# Patient Record
Sex: MALE | Race: WHITE | Employment: OTHER | URBAN - METROPOLITAN AREA
[De-identification: names, ages, dates, MRNs, and addresses within clinical notes are randomized per-mention and may not be internally consistent; named-entity substitution may affect disease eponyms.]

---

## 2017-07-17 ENCOUNTER — HOSPITAL ENCOUNTER (EMERGENCY)
Age: 77
Discharge: HOME | End: 2017-07-17
Attending: EMERGENCY MEDICINE
Payer: MEDICARE

## 2017-07-17 ENCOUNTER — APPOINTMENT (OUTPATIENT)
Dept: GENERAL RADIOLOGY | Age: 77
End: 2017-07-17
Attending: PHYSICIAN ASSISTANT
Payer: MEDICARE

## 2017-07-17 VITALS
BODY MASS INDEX: 26.05 KG/M2 | TEMPERATURE: 97.8 F | HEART RATE: 68 BPM | OXYGEN SATURATION: 98 % | DIASTOLIC BLOOD PRESSURE: 66 MMHG | HEIGHT: 70 IN | WEIGHT: 182 LBS | RESPIRATION RATE: 16 BRPM | SYSTOLIC BLOOD PRESSURE: 137 MMHG

## 2017-07-17 DIAGNOSIS — S90.32XA CONTUSION OF LEFT FOOT, INITIAL ENCOUNTER: ICD-10-CM
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
## 2017-07-17 LAB
ALBUMIN SERPL BCP-MCNC: 3.9 G/DL (ref 3.4–5)
ALBUMIN/GLOB SERPL: 1.1 {RATIO} (ref 0.8–1.7)
ALP SERPL-CCNC: 62 U/L (ref 45–117)
ALT SERPL-CCNC: 19 U/L (ref 16–61)
ANION GAP BLD CALC-SCNC: 10 MMOL/L (ref 3–18)
APTT PPP: 32.3 SEC (ref 23–36.4)
AST SERPL W P-5'-P-CCNC: 17 U/L (ref 15–37)
BASOPHILS # BLD AUTO: 0 K/UL (ref 0–0.06)
BASOPHILS # BLD: 0 % (ref 0–2)
BILIRUB SERPL-MCNC: 0.9 MG/DL (ref 0.2–1)
BUN SERPL-MCNC: 14 MG/DL (ref 7–18)
BUN/CREAT SERPL: 13 (ref 12–20)
CALCIUM SERPL-MCNC: 8.5 MG/DL (ref 8.5–10.1)
CHLORIDE SERPL-SCNC: 104 MMOL/L (ref 100–108)
CO2 SERPL-SCNC: 27 MMOL/L (ref 21–32)
CREAT SERPL-MCNC: 1.08 MG/DL (ref 0.6–1.3)
DIFFERENTIAL METHOD BLD: ABNORMAL
EOSINOPHIL # BLD: 0.2 K/UL (ref 0–0.4)
EOSINOPHIL NFR BLD: 2 % (ref 0–5)
ERYTHROCYTE [DISTWIDTH] IN BLOOD BY AUTOMATED COUNT: 13.2 % (ref 11.6–14.5)
GLOBULIN SER CALC-MCNC: 3.6 G/DL (ref 2–4)
GLUCOSE SERPL-MCNC: 95 MG/DL (ref 74–99)
HCT VFR BLD AUTO: 37.9 % (ref 36–48)
HGB BLD-MCNC: 12.8 G/DL (ref 13–16)
INR PPP: 0.9 (ref 0.8–1.2)
LYMPHOCYTES # BLD AUTO: 14 % (ref 21–52)
LYMPHOCYTES # BLD: 1.1 K/UL (ref 0.9–3.6)
MCH RBC QN AUTO: 28.5 PG (ref 24–34)
MCHC RBC AUTO-ENTMCNC: 33.8 G/DL (ref 31–37)
MCV RBC AUTO: 84.4 FL (ref 74–97)
MONOCYTES # BLD: 0.5 K/UL (ref 0.05–1.2)
MONOCYTES NFR BLD AUTO: 7 % (ref 3–10)
NEUTS SEG # BLD: 5.8 K/UL (ref 1.8–8)
NEUTS SEG NFR BLD AUTO: 77 % (ref 40–73)
PLATELET # BLD AUTO: 220 K/UL (ref 135–420)
PMV BLD AUTO: 9.7 FL (ref 9.2–11.8)
POTASSIUM SERPL-SCNC: 4 MMOL/L (ref 3.5–5.5)
PROT SERPL-MCNC: 7.5 G/DL (ref 6.4–8.2)
PROTHROMBIN TIME: 12.2 SEC (ref 11.5–15.2)
RBC # BLD AUTO: 4.49 M/UL (ref 4.7–5.5)
SODIUM SERPL-SCNC: 141 MMOL/L (ref 136–145)
WBC # BLD AUTO: 7.5 K/UL (ref 4.6–13.2)

## 2017-07-17 PROCEDURE — 85610 PROTHROMBIN TIME: CPT | Performed by: PHYSICIAN ASSISTANT

## 2017-07-17 PROCEDURE — 85730 THROMBOPLASTIN TIME PARTIAL: CPT | Performed by: PHYSICIAN ASSISTANT

## 2017-07-17 PROCEDURE — 73630 X-RAY EXAM OF FOOT: CPT

## 2017-07-17 PROCEDURE — 99283 EMERGENCY DEPT VISIT LOW MDM: CPT

## 2017-07-17 PROCEDURE — 80053 COMPREHEN METABOLIC PANEL: CPT | Performed by: PHYSICIAN ASSISTANT

## 2017-07-17 PROCEDURE — 85025 COMPLETE CBC W/AUTO DIFF WBC: CPT | Performed by: PHYSICIAN ASSISTANT

## 2017-07-17 NOTE — ED PROVIDER NOTES
HPI Comments: 12:02 PM     Harry Aviles is a 68 y.o. male presenting to the ED C/O multiple small, localized erythematous bumps to the bilateral lower extremities, left worse than right, starting yesterday while doing yard work. Pt voices concerns for insect bites, but states that he had sprayed bug spray to the area. There are two localized areas of dark/black discoloration to the base of the 3rd and 4th left toes dorsally, as well as the left lateral foot. Pt has taken Benadryl for his sxs without relief. Reports that he was on Plavix until 6 weeks ago and takes 2, 81 mg ASA daily. Denies trauma or injury to the area. Pt denies calf pain and any other symptoms or complaints. Written by BETHANIE Akhtar, as dictated by Ruthie Cobb PA-C    Patient is a 68 y.o. male presenting with foot pain. The history is provided by the patient. No  was used. Foot Pain    This is a new problem. The current episode started yesterday. The pain is present in the left foot and left lower leg. Treatments tried: Benadryl. The treatment provided no relief. Past Medical History:   Diagnosis Date    Bowel obstruction (HCC)     BPH (benign prostatic hyperplasia)     Burning with urination     Chest pain     Colon polyp     Elevated PSA     Enlarged prostate with lower urinary tract symptoms (LUTS)     Essential hypertension     Heart abnormalities     SOB (shortness of breath)     UTI (urinary tract infection)        Past Surgical History:   Procedure Laterality Date    HX CORONARY STENT PLACEMENT      HX HERNIA REPAIR      HX ORTHOPAEDIC      HX UROLOGICAL  03/08/2011    [facility]- R Bly, Right Mid, Left Bly, Left Mid, Left Base, biopsies all benign Dr. Shalom Nagy          History reviewed. No pertinent family history.     Social History     Social History    Marital status:      Spouse name: N/A    Number of children: N/A    Years of education: N/A     Occupational History  Not on file. Social History Main Topics    Smoking status: Former Smoker     Packs/day: 1.00     Years: 30.00     Types: Cigarettes     Quit date: 7/24/1970    Smokeless tobacco: Never Used    Alcohol use No    Drug use: No    Sexual activity: Not on file     Other Topics Concern    Not on file     Social History Narrative         ALLERGIES: Nsaids (non-steroidal anti-inflammatory drug)    Review of Systems   Musculoskeletal: Negative for myalgias (calf pain). Skin: Positive for color change (dark/black discoloration to the base of the 3rd and 4th left toes dorsally, as well as the left lateral foot). multiple small, localized erythematous bumps to the bilateral lower extremities       Vitals:    07/17/17 1138 07/17/17 1413   BP: 137/54 137/66   Pulse: 73 68   Resp: 20 16   Temp: 97.8 °F (36.6 °C)    SpO2: 100% 98%   Weight: 82.6 kg (182 lb)    Height: 5' 10\" (1.778 m)             Physical Exam   Constitutional: He is oriented to person, place, and time. He appears well-developed and well-nourished. No distress. Pt appears well sitting up in bed in no distress, speaking in full sentences without evidence of dyspnea, increased work of breathing or any obvious pain at this time     HENT:   Head: Normocephalic and atraumatic. Cardiovascular: Normal rate, regular rhythm, normal heart sounds and intact distal pulses. No murmur heard. Pulmonary/Chest: Effort normal and breath sounds normal. No respiratory distress. He has no wheezes. He has no rales. No murmur heard    Musculoskeletal:        Feet:    Left lower extremity: no deformity, FROM, no crepitus, N/V intact, brisk cap refill, pulses 2+ for DP and PT, no calf swelling or tenderness      Neurological: He is alert and oriented to person, place, and time. Skin:   Few scattered scabs to the BLE L>R, no surrounding erythema, induration, fluctuance at any of the sites     Psychiatric: He has a normal mood and affect.  Judgment normal. Nursing note and vitals reviewed. RESULTS:    PULSE OXIMETRY NOTE:  Pulse-ox is 100% on room air  Interpretation: normal       XR FOOT LT MIN 3 V   Final Result   IMPRESSION:     No radiographic signs of infection. Small calcaneal enthesophytes. Didi edmond         Labs Reviewed   CBC WITH AUTOMATED DIFF - Abnormal; Notable for the following:        Result Value    RBC 4.49 (*)     HGB 12.8 (*)     NEUTROPHILS 77 (*)     LYMPHOCYTES 14 (*)     All other components within normal limits   PROTHROMBIN TIME + INR   METABOLIC PANEL, COMPREHENSIVE   PTT       Recent Results (from the past 12 hour(s))   PROTHROMBIN TIME + INR    Collection Time: 07/17/17 12:30 PM   Result Value Ref Range    Prothrombin time 12.2 11.5 - 15.2 sec    INR 0.9 0.8 - 1.2     CBC WITH AUTOMATED DIFF    Collection Time: 07/17/17 12:30 PM   Result Value Ref Range    WBC 7.5 4.6 - 13.2 K/uL    RBC 4.49 (L) 4.70 - 5.50 M/uL    HGB 12.8 (L) 13.0 - 16.0 g/dL    HCT 37.9 36.0 - 48.0 %    MCV 84.4 74.0 - 97.0 FL    MCH 28.5 24.0 - 34.0 PG    MCHC 33.8 31.0 - 37.0 g/dL    RDW 13.2 11.6 - 14.5 %    PLATELET 165 581 - 845 K/uL    MPV 9.7 9.2 - 11.8 FL    NEUTROPHILS 77 (H) 40 - 73 %    LYMPHOCYTES 14 (L) 21 - 52 %    MONOCYTES 7 3 - 10 %    EOSINOPHILS 2 0 - 5 %    BASOPHILS 0 0 - 2 %    ABS. NEUTROPHILS 5.8 1.8 - 8.0 K/UL    ABS. LYMPHOCYTES 1.1 0.9 - 3.6 K/UL    ABS. MONOCYTES 0.5 0.05 - 1.2 K/UL    ABS. EOSINOPHILS 0.2 0.0 - 0.4 K/UL    ABS.  BASOPHILS 0.0 0.0 - 0.06 K/UL    DF AUTOMATED     METABOLIC PANEL, COMPREHENSIVE    Collection Time: 07/17/17 12:30 PM   Result Value Ref Range    Sodium 141 136 - 145 mmol/L    Potassium 4.0 3.5 - 5.5 mmol/L    Chloride 104 100 - 108 mmol/L    CO2 27 21 - 32 mmol/L    Anion gap 10 3.0 - 18 mmol/L    Glucose 95 74 - 99 mg/dL    BUN 14 7.0 - 18 MG/DL    Creatinine 1.08 0.6 - 1.3 MG/DL    BUN/Creatinine ratio 13 12 - 20      GFR est AA >60 >60 ml/min/1.73m2    GFR est non-AA >60 >60 ml/min/1.73m2    Calcium 8.5 8.5 - 10.1 MG/DL    Bilirubin, total 0.9 0.2 - 1.0 MG/DL    ALT (SGPT) 19 16 - 61 U/L    AST (SGOT) 17 15 - 37 U/L    Alk. phosphatase 62 45 - 117 U/L    Protein, total 7.5 6.4 - 8.2 g/dL    Albumin 3.9 3.4 - 5.0 g/dL    Globulin 3.6 2.0 - 4.0 g/dL    A-G Ratio 1.1 0.8 - 1.7     PTT    Collection Time: 07/17/17 12:30 PM   Result Value Ref Range    aPTT 32.3 23.0 - 36.4 SEC        MDM  Number of Diagnoses or Management Options  Contusion of left foot, initial encounter:   Insect bites, initial encounter:   Diagnosis management comments: Anemia, bleeding disorder, fx, contusion, doubt DVT, no evidence of cellulitis or allergic reaction   doubt endocarditis       Amount and/or Complexity of Data Reviewed  Clinical lab tests: reviewed and ordered  Tests in the radiology section of CPT®: ordered and reviewed (Left foot X-ray)  Discuss the patient with other providers: yes Chris Trent MD (ED Attending))  Independent visualization of images, tracings, or specimens: yes (Left foot X-ray)      ED Course     MEDICATIONS GIVEN:  Medications - No data to display     Procedures    PROGRESS NOTE:  12:02 PM  Initial assessment performed. Written by Francie Homans, ED Scribe, as dictated by Veda Tse PA-C    CONSULT NOTE:   12:15 PM  Veda Tse PA-C spoke with Chris Trent MD   Specialty: ED Attending  Discussed pt's hx, disposition, and available diagnostic and imaging results in the ED. Reviewed care plans. Consulting physician agrees with plans. Written by Francie Homans, ED Scribe, as dictated by Veda Tse PA-C.     DISCHARGE NOTE:  2:11 PM   Chepe Bae  results have been reviewed with him. He has been counseled regarding his diagnosis, treatment, and plan. He verbally conveys understanding and agreement of the signs, symptoms, diagnosis, treatment and prognosis and additionally agrees to follow up as discussed.   He also agrees with the care-plan and conveys that all of his questions have been answered. I have also provided discharge instructions for him that include: educational information regarding their diagnosis and treatment, and list of reasons why they would want to return to the ED prior to their follow-up appointment, should his condition change. The patient and/or family has been provided with education for proper Emergency Department utilization. CLINICAL IMPRESSION:    1. Insect bites, initial encounter    2. Contusion of left foot, initial encounter        PLAN: DISCHARGE HOME    Follow-up Information     Follow up With Details Comments Contact Info    Humera Gonzalez MD Schedule an appointment as soon as possible for a visit in 2 days For primary care follow up [address] [place] [address] EMERGENCY DEPT  As needed, If symptoms worsen [address] [place] [id number]  [phone]          Discharge Medication List as of 7/17/2017  2:10 PM          ATTESTATIONS:  This note is prepared by Kaylan Roger, acting as Scribe for Mehreen Menchaca PA-C. Mehreen Menchaca PA-C : The scribe's documentation has been prepared under my direction and personally reviewed by me in its entirety. I confirm that the note above accurately reflects all work, treatment, procedures, and medical decision making performed by me.

## 2017-07-17 NOTE — ED NOTES
Pt ambulates into ED w/ complaints of multiple bug bites/pain/swelling to LLE. Swelling noted, bruising to 4th and 5th toe noted. Pt states he was cutting the grass and doing yard work 3 days ago and noticed the bug bites later that day.   Pt states he has been using OTC anti itch cream.  +Pedal pulses bilat

## 2017-07-17 NOTE — ED TRIAGE NOTES
Patient started with bites to bilateral leg. Patient used OTC anti itch cream. Patient now states that he has areas of bruising to the left foot. Sepsis Screening completed    (  )Patient meets SIRS criteria. (x  )Patient does not meet SIRS criteria.       SIRS Criteria is achieved when two or more of the following are present   Temperature < 96.8°F (36°C) or > 100.9°F (38.3°C)   Heart Rate > 90 beats per minute   Respiratory Rate > 20 breaths per minute   WBC count > 12,000 or <4,000 or > 10% bands

## 2017-07-17 NOTE — DISCHARGE INSTRUCTIONS
Contusion: Care Instructions  Your Care Instructions  Contusion is the medical term for a bruise. It is the result of a direct blow or an impact, such as a fall. Contusions are common sports injuries. Most people think of a bruise as a black-and-blue spot. This happens when small blood vessels get torn and leak blood under the skin. But bones, muscles, and organs can also get bruised. This may damage deep tissues but not cause a bruise you can see. The doctor will do a physical exam to find the location of your contusion. You may also have tests to make sure you do not have a more serious injury, such as a broken bone or nerve damage. These may include X-rays or other imaging tests like a CT scan or MRI. Deep-tissue contusions may cause pain and swelling. But if there is no serious damage, they will often get better in a few weeks with home treatment. The doctor has checked you carefully, but problems can develop later. If you notice any problems or new symptoms, get medical treatment right away. Follow-up care is a key part of your treatment and safety. Be sure to make and go to all appointments, and call your doctor if you are having problems. It's also a good idea to know your test results and keep a list of the medicines you take. How can you care for yourself at home? · Put ice or a cold pack on the sore area for 10 to 20 minutes at a time to stop swelling. Put a thin cloth between the ice pack and your skin. · Be safe with medicines. Read and follow all instructions on the label. ¨ If the doctor gave you a prescription medicine for pain, take it as prescribed. ¨ If you are not taking a prescription pain medicine, ask your doctor if you can take an over-the-counter medicine. · If you can, prop up the sore area on pillows as much as possible for the next few days. Try to keep the sore area above the level of your heart. When should you call for help?   Call your doctor now or seek immediate medical care if:  · Your pain gets worse. · You have new or worse swelling. · You have tingling, weakness, or numbness in the area near the contusion. · The area near the contusion is cold or pale. Watch closely for changes in your health, and be sure to contact your doctor if:  · You do not get better as expected. Where can you learn more? Go to [employer]  Enter [id number] in the search box to learn more about \"Contusion: Care Instructions. \"   © 1120-3240 [employer], [employer]. Care instructions adapted under license by [facility] [employer] (which disclaims liability or warranty for this information). This care instruction is for use with your licensed healthcare professional. If you have questions about a medical condition or this instruction, always ask your healthcare professional. [address] any warranty or liability for your use of this information. Content Version: 67.4.209099; Current as of: May 22, 2015           Insect Stings and Bites: Care Instructions  Your Care Instructions  Stings and bites from bees, wasps, ants, and other insects often cause pain, swelling, redness, and itching. In some people, especially children, the redness and swelling may be worse. It may extend several inches beyond the affected area. But in most cases, stings and bites don't cause reactions all over the body. If you have had a reaction to an insect sting or bite, you are at risk for a reaction if you get stung or bitten again. Follow-up care is a key part of your treatment and safety. Be sure to make and go to all appointments, and call your doctor if you are having problems. It's also a good idea to know your test results and keep a list of the medicines you take. How can you care for yourself at home? · Do not scratch or rub the skin where the sting or bite occurred. · Put a cold pack or ice cube on the area. Put a thin cloth between the ice and your skin.  For some people, a paste of baking soda mixed with a little water helps relieve pain and decrease the reaction. · Take an over-the-counter antihistamine, such as diphenhydramine (Benadryl) or loratadine (Claritin), to relieve swelling, redness, and itching. Calamine lotion or hydrocortisone cream may also help. Do not give antihistamines to your child unless you have checked with the doctor first.  · Be safe with medicines. If your doctor prescribed medicine for your allergy, take it exactly as prescribed. Call your doctor if you think you are having a problem with your medicine. You will get more details on the specific medicines your doctor prescribes. · Your doctor may prescribe a shot of epinephrine to carry with you in case you have a severe reaction. Learn how and when to give yourself the shot, and keep it with you at all times. Make sure it has not . · Go to the emergency room anytime you have a severe reaction. Go even if you have given yourself epinephrine and are feeling better. Symptoms can come back. When should you call for help? Call 911 anytime you think you may need emergency care. For example, call if:  · You have symptoms of a severe allergic reaction. These may include:  ¨ Sudden raised, red areas (hives) all over your body. ¨ Swelling of the throat, mouth, lips, or tongue. ¨ Trouble breathing. ¨ Passing out (losing consciousness). Or you may feel very lightheaded or suddenly feel weak, confused, or restless. Call your doctor now or seek immediate medical care if:  · You have symptoms of an allergic reaction not right at the sting or bite, such as:  ¨ A rash or small area of hives (raised, red areas on the skin). ¨ Itching. ¨ Swelling. ¨ Belly pain, nausea, or vomiting. · You have a lot of swelling around the site (such as your entire arm or leg is swollen). · You have signs of infection, such as:  ¨ Increased pain, swelling, redness, or warmth around the sting.   ¨ Red streaks leading from the area.  ¨ Pus draining from the sting. ¨ A fever. Watch closely for changes in your health, and be sure to contact your doctor if:  · You do not get better as expected. Where can you learn more? Go to http://giovanny-radha.info/. Enter P390 in the search box to learn more about \"Insect Stings and Bites: Care Instructions. \"  Current as of: March 20, 2017  Content Version: 11.3  © 4527-6464 [employer]. Care instructions adapted under license by [employer] (which disclaims liability or warranty for this information). If you have questions about a medical condition or this instruction, always ask your healthcare professional. [place] [address] any warranty or liability for your use of this information.

## 2018-02-27 ENCOUNTER — APPOINTMENT (OUTPATIENT)
Dept: CT IMAGING | Age: 78
End: 2018-02-27
Attending: PHYSICIAN ASSISTANT
Payer: MEDICARE

## 2018-02-27 ENCOUNTER — APPOINTMENT (OUTPATIENT)
Dept: GENERAL RADIOLOGY | Age: 78
End: 2018-02-27
Attending: EMERGENCY MEDICINE
Payer: MEDICARE

## 2018-02-27 ENCOUNTER — HOSPITAL ENCOUNTER (EMERGENCY)
Age: 78
Discharge: HOME | End: 2018-02-27
Attending: EMERGENCY MEDICINE
Payer: MEDICARE

## 2018-02-27 VITALS
BODY MASS INDEX: 25.91 KG/M2 | SYSTOLIC BLOOD PRESSURE: 151 MMHG | OXYGEN SATURATION: 99 % | TEMPERATURE: 97.8 F | RESPIRATION RATE: 16 BRPM | HEART RATE: 69 BPM | DIASTOLIC BLOOD PRESSURE: 72 MMHG | HEIGHT: 70 IN | WEIGHT: 181 LBS

## 2018-02-27 DIAGNOSIS — S22.22XA CLOSED FRACTURE OF BODY OF STERNUM, INITIAL ENCOUNTER: Primary | ICD-10-CM
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
## 2018-02-27 LAB
ALBUMIN SERPL-MCNC: 3.6 G/DL (ref 3.4–5)
ALBUMIN/GLOB SERPL: 0.9 {RATIO} (ref 0.8–1.7)
ALP SERPL-CCNC: 78 U/L (ref 45–117)
ALT SERPL-CCNC: 19 U/L (ref 16–61)
ANION GAP SERPL CALC-SCNC: 8 MMOL/L (ref 3–18)
AST SERPL-CCNC: 18 U/L (ref 15–37)
BASOPHILS # BLD: 0 K/UL (ref 0–0.06)
BASOPHILS NFR BLD: 0 % (ref 0–2)
BILIRUB SERPL-MCNC: 0.6 MG/DL (ref 0.2–1)
BUN SERPL-MCNC: 16 MG/DL (ref 7–18)
BUN/CREAT SERPL: 18 (ref 12–20)
CALCIUM SERPL-MCNC: 8.7 MG/DL (ref 8.5–10.1)
CHLORIDE SERPL-SCNC: 104 MMOL/L (ref 100–108)
CK MB CFR SERPL CALC: 1.2 % (ref 0–4)
CK MB SERPL-MCNC: 1.4 NG/ML (ref 5–25)
CK SERPL-CCNC: 118 U/L (ref 39–308)
CO2 SERPL-SCNC: 29 MMOL/L (ref 21–32)
CREAT SERPL-MCNC: 0.89 MG/DL (ref 0.6–1.3)
DIFFERENTIAL METHOD BLD: ABNORMAL
EOSINOPHIL # BLD: 0.4 K/UL (ref 0–0.4)
EOSINOPHIL NFR BLD: 4 % (ref 0–5)
ERYTHROCYTE [DISTWIDTH] IN BLOOD BY AUTOMATED COUNT: 13.1 % (ref 11.6–14.5)
GLOBULIN SER CALC-MCNC: 4.1 G/DL (ref 2–4)
GLUCOSE SERPL-MCNC: 104 MG/DL (ref 74–99)
HCT VFR BLD AUTO: 39 % (ref 36–48)
HGB BLD-MCNC: 12.6 G/DL (ref 13–16)
LYMPHOCYTES # BLD: 1.9 K/UL (ref 0.9–3.6)
LYMPHOCYTES NFR BLD: 23 % (ref 21–52)
MCH RBC QN AUTO: 27.6 PG (ref 24–34)
MCHC RBC AUTO-ENTMCNC: 32.3 G/DL (ref 31–37)
MCV RBC AUTO: 85.5 FL (ref 74–97)
MONOCYTES # BLD: 0.6 K/UL (ref 0.05–1.2)
MONOCYTES NFR BLD: 7 % (ref 3–10)
NEUTS SEG # BLD: 5.5 K/UL (ref 1.8–8)
NEUTS SEG NFR BLD: 66 % (ref 40–73)
PLATELET # BLD AUTO: 231 K/UL (ref 135–420)
PMV BLD AUTO: 9.6 FL (ref 9.2–11.8)
POTASSIUM SERPL-SCNC: 4.5 MMOL/L (ref 3.5–5.5)
PROT SERPL-MCNC: 7.7 G/DL (ref 6.4–8.2)
RBC # BLD AUTO: 4.56 M/UL (ref 4.7–5.5)
SODIUM SERPL-SCNC: 141 MMOL/L (ref 136–145)
TROPONIN I SERPL-MCNC: <0.02 NG/ML (ref 0–0.06)
WBC # BLD AUTO: 8.4 K/UL (ref 4.6–13.2)

## 2018-02-27 PROCEDURE — 74011636320 HC RX REV CODE- 636/320: Performed by: EMERGENCY MEDICINE

## 2018-02-27 PROCEDURE — 80053 COMPREHEN METABOLIC PANEL: CPT | Performed by: PHYSICIAN ASSISTANT

## 2018-02-27 PROCEDURE — 71260 CT THORAX DX C+: CPT

## 2018-02-27 PROCEDURE — 82550 ASSAY OF CK (CPK): CPT | Performed by: PHYSICIAN ASSISTANT

## 2018-02-27 PROCEDURE — 85025 COMPLETE CBC W/AUTO DIFF WBC: CPT | Performed by: PHYSICIAN ASSISTANT

## 2018-02-27 PROCEDURE — 71046 X-RAY EXAM CHEST 2 VIEWS: CPT

## 2018-02-27 PROCEDURE — 93005 ELECTROCARDIOGRAM TRACING: CPT

## 2018-02-27 PROCEDURE — 99284 EMERGENCY DEPT VISIT MOD MDM: CPT

## 2018-02-27 RX ADMIN — IOPAMIDOL 100 ML: 612 INJECTION, SOLUTION INTRAVENOUS at 20:17
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
## 2018-03-04 LAB
ATRIAL RATE: 72 BPM
CALCULATED P AXIS, ECG09: 74 DEGREES
CALCULATED R AXIS, ECG10: 73 DEGREES
CALCULATED T AXIS, ECG11: 79 DEGREES
DIAGNOSIS, 93000: NORMAL
P-R INTERVAL, ECG05: 172 MS
Q-T INTERVAL, ECG07: 416 MS
QRS DURATION, ECG06: 92 MS
QTC CALCULATION (BEZET), ECG08: 455 MS
VENTRICULAR RATE, ECG03: 72 BPM

## 2018-05-03 PROBLEM — R39.11 URINARY HESITANCY: Status: ACTIVE | Noted: 2018-05-03

## 2019-05-29 ENCOUNTER — HOSPITAL ENCOUNTER (INPATIENT)
Age: 79
LOS: 2 days | Discharge: HOME | DRG: 390 | End: 2019-05-31
Attending: EMERGENCY MEDICINE | Admitting: SURGERY
Payer: MEDICARE

## 2019-05-29 ENCOUNTER — APPOINTMENT (OUTPATIENT)
Dept: GENERAL RADIOLOGY | Age: 79
DRG: 390 | End: 2019-05-29
Attending: EMERGENCY MEDICINE
Payer: MEDICARE

## 2019-05-29 ENCOUNTER — APPOINTMENT (OUTPATIENT)
Dept: CT IMAGING | Age: 79
DRG: 390 | End: 2019-05-29
Attending: EMERGENCY MEDICINE
Payer: MEDICARE

## 2019-05-29 DIAGNOSIS — K56.609 SBO (SMALL BOWEL OBSTRUCTION) (HCC): Primary | ICD-10-CM

## 2019-05-29 LAB
ALBUMIN SERPL-MCNC: 3.9 G/DL (ref 3.4–5)
ALBUMIN/GLOB SERPL: 1.1 {RATIO} (ref 0.8–1.7)
ALP SERPL-CCNC: 80 U/L (ref 45–117)
ALT SERPL-CCNC: 21 U/L (ref 16–61)
ANION GAP SERPL CALC-SCNC: 10 MMOL/L (ref 3–18)
APTT PPP: 31.8 SEC (ref 23–36.4)
AST SERPL-CCNC: 26 U/L (ref 15–37)
BASOPHILS # BLD: 0 K/UL (ref 0–0.1)
BASOPHILS NFR BLD: 0 % (ref 0–2)
BILIRUB SERPL-MCNC: 1.4 MG/DL (ref 0.2–1)
BUN SERPL-MCNC: 17 MG/DL (ref 7–18)
BUN/CREAT SERPL: 16 (ref 12–20)
CALCIUM SERPL-MCNC: 8.9 MG/DL (ref 8.5–10.1)
CHLORIDE SERPL-SCNC: 102 MMOL/L (ref 100–108)
CO2 SERPL-SCNC: 26 MMOL/L (ref 21–32)
CREAT SERPL-MCNC: 1.05 MG/DL (ref 0.6–1.3)
DIFFERENTIAL METHOD BLD: ABNORMAL
EOSINOPHIL # BLD: 0.1 K/UL (ref 0–0.4)
EOSINOPHIL NFR BLD: 1 % (ref 0–5)
ERYTHROCYTE [DISTWIDTH] IN BLOOD BY AUTOMATED COUNT: 13.2 % (ref 11.6–14.5)
GLOBULIN SER CALC-MCNC: 3.7 G/DL (ref 2–4)
GLUCOSE SERPL-MCNC: 130 MG/DL (ref 74–99)
HCT VFR BLD AUTO: 42.2 % (ref 36–48)
HGB BLD-MCNC: 14.2 G/DL (ref 13–16)
INR PPP: 1 (ref 0.8–1.2)
LACTATE SERPL-SCNC: 1.3 MMOL/L (ref 0.4–2)
LACTATE SERPL-SCNC: 2.2 MMOL/L (ref 0.4–2)
LIPASE SERPL-CCNC: 46 U/L (ref 73–393)
LYMPHOCYTES # BLD: 0.8 K/UL (ref 0.9–3.6)
LYMPHOCYTES NFR BLD: 7 % (ref 21–52)
MAGNESIUM SERPL-MCNC: 2.2 MG/DL (ref 1.6–2.6)
MCH RBC QN AUTO: 28.8 PG (ref 24–34)
MCHC RBC AUTO-ENTMCNC: 33.6 G/DL (ref 31–37)
MCV RBC AUTO: 85.6 FL (ref 74–97)
MONOCYTES # BLD: 0.7 K/UL (ref 0.05–1.2)
MONOCYTES NFR BLD: 6 % (ref 3–10)
NEUTS SEG # BLD: 10.3 K/UL (ref 1.8–8)
NEUTS SEG NFR BLD: 86 % (ref 40–73)
PLATELET # BLD AUTO: 218 K/UL (ref 135–420)
PMV BLD AUTO: 9.9 FL (ref 9.2–11.8)
POTASSIUM SERPL-SCNC: 4 MMOL/L (ref 3.5–5.5)
PROT SERPL-MCNC: 7.6 G/DL (ref 6.4–8.2)
PROTHROMBIN TIME: 13 SEC (ref 11.5–15.2)
RBC # BLD AUTO: 4.93 M/UL (ref 4.7–5.5)
SODIUM SERPL-SCNC: 138 MMOL/L (ref 136–145)
WBC # BLD AUTO: 11.9 K/UL (ref 4.6–13.2)

## 2019-05-29 PROCEDURE — 74011250636 HC RX REV CODE- 250/636: Performed by: SURGERY

## 2019-05-29 PROCEDURE — 85025 COMPLETE CBC W/AUTO DIFF WBC: CPT

## 2019-05-29 PROCEDURE — 96375 TX/PRO/DX INJ NEW DRUG ADDON: CPT

## 2019-05-29 PROCEDURE — 85730 THROMBOPLASTIN TIME PARTIAL: CPT

## 2019-05-29 PROCEDURE — 74011636320 HC RX REV CODE- 636/320: Performed by: EMERGENCY MEDICINE

## 2019-05-29 PROCEDURE — 74011250636 HC RX REV CODE- 250/636: Performed by: EMERGENCY MEDICINE

## 2019-05-29 PROCEDURE — 87040 BLOOD CULTURE FOR BACTERIA: CPT

## 2019-05-29 PROCEDURE — 83690 ASSAY OF LIPASE: CPT

## 2019-05-29 PROCEDURE — 85610 PROTHROMBIN TIME: CPT

## 2019-05-29 PROCEDURE — 96361 HYDRATE IV INFUSION ADD-ON: CPT

## 2019-05-29 PROCEDURE — 36415 COLL VENOUS BLD VENIPUNCTURE: CPT

## 2019-05-29 PROCEDURE — 96374 THER/PROPH/DIAG INJ IV PUSH: CPT

## 2019-05-29 PROCEDURE — 74011000258 HC RX REV CODE- 258: Performed by: EMERGENCY MEDICINE

## 2019-05-29 PROCEDURE — 83735 ASSAY OF MAGNESIUM: CPT

## 2019-05-29 PROCEDURE — 80053 COMPREHEN METABOLIC PANEL: CPT

## 2019-05-29 PROCEDURE — 74018 RADEX ABDOMEN 1 VIEW: CPT

## 2019-05-29 PROCEDURE — 74174 CTA ABD&PLVS W/CONTRAST: CPT

## 2019-05-29 PROCEDURE — 83605 ASSAY OF LACTIC ACID: CPT

## 2019-05-29 PROCEDURE — 99284 EMERGENCY DEPT VISIT MOD MDM: CPT

## 2019-05-29 PROCEDURE — 65270000029 HC RM PRIVATE

## 2019-05-29 RX ORDER — SODIUM CHLORIDE 0.9 % (FLUSH) 0.9 %
5-10 SYRINGE (ML) INJECTION AS NEEDED
Status: DISCONTINUED | OUTPATIENT
Start: 2019-05-29 | End: 2019-05-31 | Stop reason: HOSPADM

## 2019-05-29 RX ORDER — MORPHINE SULFATE 2 MG/ML
2 INJECTION, SOLUTION INTRAMUSCULAR; INTRAVENOUS ONCE
Status: COMPLETED | OUTPATIENT
Start: 2019-05-29 | End: 2019-05-29

## 2019-05-29 RX ORDER — FENTANYL CITRATE 50 UG/ML
50 INJECTION, SOLUTION INTRAMUSCULAR; INTRAVENOUS
Status: COMPLETED | OUTPATIENT
Start: 2019-05-29 | End: 2019-05-29

## 2019-05-29 RX ORDER — SODIUM CHLORIDE 9 MG/ML
125 INJECTION, SOLUTION INTRAVENOUS CONTINUOUS
Status: DISCONTINUED | OUTPATIENT
Start: 2019-05-29 | End: 2019-05-30

## 2019-05-29 RX ORDER — ONDANSETRON 2 MG/ML
4 INJECTION INTRAMUSCULAR; INTRAVENOUS
Status: DISCONTINUED | OUTPATIENT
Start: 2019-05-29 | End: 2019-05-31 | Stop reason: HOSPADM

## 2019-05-29 RX ORDER — HYDROMORPHONE HYDROCHLORIDE 1 MG/ML
1 INJECTION, SOLUTION INTRAMUSCULAR; INTRAVENOUS; SUBCUTANEOUS
Status: DISCONTINUED | OUTPATIENT
Start: 2019-05-29 | End: 2019-05-31 | Stop reason: HOSPADM

## 2019-05-29 RX ADMIN — ONDANSETRON 4 MG: 2 INJECTION INTRAMUSCULAR; INTRAVENOUS at 21:45

## 2019-05-29 RX ADMIN — HYDROMORPHONE HYDROCHLORIDE 1 MG: 1 INJECTION, SOLUTION INTRAMUSCULAR; INTRAVENOUS; SUBCUTANEOUS at 22:23

## 2019-05-29 RX ADMIN — SODIUM CHLORIDE 1000 ML: 900 INJECTION, SOLUTION INTRAVENOUS at 17:48

## 2019-05-29 RX ADMIN — FENTANYL CITRATE 50 MCG: 50 INJECTION, SOLUTION INTRAMUSCULAR; INTRAVENOUS at 15:47

## 2019-05-29 RX ADMIN — SODIUM CHLORIDE 125 ML/HR: 900 INJECTION, SOLUTION INTRAVENOUS at 21:45

## 2019-05-29 RX ADMIN — IOPAMIDOL 100 ML: 755 INJECTION, SOLUTION INTRAVENOUS at 17:06

## 2019-05-29 RX ADMIN — MORPHINE SULFATE 2 MG: 2 INJECTION, SOLUTION INTRAMUSCULAR; INTRAVENOUS at 17:22

## 2019-05-29 RX ADMIN — SODIUM CHLORIDE 2478 ML: 900 INJECTION, SOLUTION INTRAVENOUS at 19:58

## 2019-05-29 RX ADMIN — SODIUM CHLORIDE 500 ML: 900 INJECTION, SOLUTION INTRAVENOUS at 17:49

## 2019-05-29 RX ADMIN — PIPERACILLIN SODIUM,TAZOBACTAM SODIUM 3.38 G: 3; .375 INJECTION, POWDER, FOR SOLUTION INTRAVENOUS at 18:33

## 2019-05-29 RX ADMIN — SODIUM CHLORIDE 1000 ML: 900 INJECTION, SOLUTION INTRAVENOUS at 15:47

## 2019-05-29 NOTE — ED PROVIDER NOTES
EMERGENCY DEPARTMENT HISTORY AND PHYSICAL EXAM    Date: 5/29/2019  Patient Name: Sydnee Redman    History of Presenting Illness     Chief Complaint   Patient presents with    Abdominal Pain         History Provided By: Patient    Additional History (Context):   Sydnee Redman is a 66 y.o. male with PMHX CAD status post PCI, prior SBO, history of known stenosis of the inferior mesenteric artery presents to the emergency department C/O worsening abdominal pain over the last 2 days. Patient reports that he has had 1 month of abdominal pain however over the last 2 days developed vomiting and worsening pain. Patient was seen at his primary care physician and referred here for further evaluation. Last bowel movement was yesterday. Pt denies constipation, dysuria, hematuria, fever, chest pain or shortness of breath, and any other sxs or complaints. PCP: Maisha Peter MD    Current Facility-Administered Medications   Medication Dose Route Frequency Provider Last Rate Last Dose    piperacillin-tazobactam (ZOSYN) 3.375 g in 0.9% sodium chloride (MBP/ADV) 100 mL MBP  3.375 g IntraVENous Q6H Mekhi Navarro DO 25 mL/hr at 05/29/19 1833 3.375 g at 05/29/19 1833    sodium chloride (NS) flush 5-10 mL  5-10 mL IntraVENous PRN Mekhi Navarro DO         Current Outpatient Medications   Medication Sig Dispense Refill    tamsulosin (FLOMAX) 0.4 mg capsule Take 1 Cap by mouth daily (after dinner). 90 Cap 3    finasteride (PROSCAR) 5 mg tablet TAKE 1 TABLET DAILY 90 Tab 3    aspirin delayed-release 81 mg tablet Take 1 Tab by mouth.  lisinopril (PRINIVIL, ZESTRIL) 10 mg tablet       atorvastatin (LIPITOR) 40 mg tablet   3    metoprolol (LOPRESSOR) 50 mg tablet Take 25 mg by mouth two (2) times a day.  pantoprazole (PROTONIX) 40 mg tablet Take 40 mg by mouth daily.  nitroglycerin (NITROSTAT) 0.4 mg SL tablet by SubLINGual route every five (5) minutes as needed for Chest Pain. Past History     Past Medical History:  Past Medical History:   Diagnosis Date    Bowel obstruction (HCC)     BPH (benign prostatic hyperplasia)     Burning with urination     Chest pain     Colon polyp     Elevated PSA     Enlarged prostate with lower urinary tract symptoms (LUTS)     Essential hypertension     Heart abnormalities     SOB (shortness of breath)     UTI (urinary tract infection)        Past Surgical History:  Past Surgical History:   Procedure Laterality Date    HX CORONARY STENT PLACEMENT      HX HERNIA REPAIR      HX ORTHOPAEDIC      HX UROLOGICAL  2011    [facility]- R [place], Right Mid, Left [place], Left Mid, Left Base, biopsies all benign Dr. Marin Wasserman        Family History:  History reviewed. No pertinent family history. Social History:  Social History     Tobacco Use    Smoking status: Former Smoker     Packs/day: 1.00     Years: 30.00     Pack years: 30.00     Types: Cigarettes     Last attempt to quit: 1970     Years since quittin.8    Smokeless tobacco: Never Used   Substance Use Topics    Alcohol use: No    Drug use: No       Allergies: Allergies   Allergen Reactions    Naproxen Other (comments) and Unknown (comments)     Per pt it \"attacks my prostate\"    Nsaids (Non-Steroidal Anti-Inflammatory Drug) Other (comments)     Prostate swelling         Review of Systems   Review of Systems   Constitutional: Negative for chills and fever. HENT: Negative for congestion, ear pain, sinus pain and sore throat. Eyes: Negative for pain and visual disturbance. Respiratory: Negative for cough and shortness of breath. Cardiovascular: Negative for chest pain and leg swelling. Gastrointestinal: Positive for abdominal pain, nausea and vomiting. Negative for constipation and diarrhea. Genitourinary: Negative for dysuria and hematuria. Musculoskeletal: Negative for back pain and neck pain. Skin: Negative for pallor and rash.    Neurological: Negative for dizziness, tremors, weakness, light-headedness and headaches. All other systems reviewed and are negative. Physical Exam     Vitals:    05/29/19 1537 05/29/19 1835   BP: 147/77 133/60   Pulse: 70 85   Resp: 13 25   Temp: 98.7 °F (37.1 °C) 98.3 °F (36.8 °C)   SpO2: 99% 97%   Weight: 82.6 kg (182 lb)    Height: 5' 10\" (1.778 m)      Physical Exam    Nursing note and vitals reviewed    Constitutional: Elderly  male, in moderate distress  Head: Normocephalic, Atraumatic  Eyes: Pupils are equal, round, and reactive to light, EOMI  ENT: Dry mucous membranes  Neck: Supple, non-tender  Cardiovascular: Regular rate and rhythm, no murmurs, rubs, or gallops  Chest: Normal work of breathing and chest excursion bilaterally  Lungs: Clear to ausculation bilaterally, no wheezes, no rhonchi  Abdomen: Protuberant abdomen, hyperactive bowel sounds, generalized moderate tenderness with no rebound or guarding   Back: No evidence of trauma or deformity  Extremities: No evidence of trauma or deformity, no LE edema  Skin: Warm and dry, normal cap refill  Neuro: Alert and appropriate, CN intact, normal speech, moving all 4 extremities freely and symmetrically  Psychiatric: Normal mood and affect       Diagnostic Study Results     Labs -     Recent Results (from the past 12 hour(s))   CBC WITH AUTOMATED DIFF    Collection Time: 05/29/19  3:55 PM   Result Value Ref Range    WBC 11.9 4.6 - 13.2 K/uL    RBC 4.93 4.70 - 5.50 M/uL    HGB 14.2 13.0 - 16.0 g/dL    HCT 42.2 36.0 - 48.0 %    MCV 85.6 74.0 - 97.0 FL    MCH 28.8 24.0 - 34.0 PG    MCHC 33.6 31.0 - 37.0 g/dL    RDW 13.2 11.6 - 14.5 %    PLATELET 834 086 - 391 K/uL    MPV 9.9 9.2 - 11.8 FL    NEUTROPHILS 86 (H) 40 - 73 %    LYMPHOCYTES 7 (L) 21 - 52 %    MONOCYTES 6 3 - 10 %    EOSINOPHILS 1 0 - 5 %    BASOPHILS 0 0 - 2 %    ABS. NEUTROPHILS 10.3 (H) 1.8 - 8.0 K/UL    ABS. LYMPHOCYTES 0.8 (L) 0.9 - 3.6 K/UL    ABS. MONOCYTES 0.7 0.05 - 1.2 K/UL    ABS.  EOSINOPHILS 0.1 0.0 - 0.4 K/UL    ABS. BASOPHILS 0.0 0.0 - 0.1 K/UL    DF AUTOMATED     PROTHROMBIN TIME + INR    Collection Time: 05/29/19  3:55 PM   Result Value Ref Range    Prothrombin time 13.0 11.5 - 15.2 sec    INR 1.0 0.8 - 1.2     METABOLIC PANEL, COMPREHENSIVE    Collection Time: 05/29/19  3:55 PM   Result Value Ref Range    Sodium 138 136 - 145 mmol/L    Potassium 4.0 3.5 - 5.5 mmol/L    Chloride 102 100 - 108 mmol/L    CO2 26 21 - 32 mmol/L    Anion gap 10 3.0 - 18 mmol/L    Glucose 130 (H) 74 - 99 mg/dL    BUN 17 7.0 - 18 MG/DL    Creatinine 1.05 0.6 - 1.3 MG/DL    BUN/Creatinine ratio 16 12 - 20      GFR est AA >60 >60 ml/min/1.73m2    GFR est non-AA >60 >60 ml/min/1.73m2    Calcium 8.9 8.5 - 10.1 MG/DL    Bilirubin, total 1.4 (H) 0.2 - 1.0 MG/DL    ALT (SGPT) 21 16 - 61 U/L    AST (SGOT) 26 15 - 37 U/L    Alk. phosphatase 80 45 - 117 U/L    Protein, total 7.6 6.4 - 8.2 g/dL    Albumin 3.9 3.4 - 5.0 g/dL    Globulin 3.7 2.0 - 4.0 g/dL    A-G Ratio 1.1 0.8 - 1.7     LIPASE    Collection Time: 05/29/19  3:55 PM   Result Value Ref Range    Lipase 46 (L) 73 - 393 U/L   MAGNESIUM    Collection Time: 05/29/19  3:55 PM   Result Value Ref Range    Magnesium 2.2 1.6 - 2.6 mg/dL   LACTIC ACID    Collection Time: 05/29/19  3:55 PM   Result Value Ref Range    Lactic acid 2.2 (HH) 0.4 - 2.0 MMOL/L   PTT    Collection Time: 05/29/19  3:55 PM   Result Value Ref Range    aPTT 31.8 23.0 - 36.4 SEC   LACTIC ACID    Collection Time: 05/29/19  6:05 PM   Result Value Ref Range    Lactic acid 1.3 0.4 - 2.0 MMOL/L       Radiologic Studies -   CTA ABD PELV W WO CONT   Final Result   IMPRESSION:      There is small bowel obstruction without pneumatosis or portal venous gas or   free air with transition zone in the mid lower abdomen. 7 mm nodule left lower lobe follow-up as per Fleischner Society protocol.        Small hiatal hernia      Colonic diverticulosis       Enlarged prostate with bladder diverticulum        Severe calcific atherosclerosis with stenosis at the origin of the vessels as   described. .      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    -High risk for lung cancer: CT at 6-12 months, then CT at 18-24 months. CT Results  (Last 48 hours)               05/29/19 1722  CTA ABD PELV W WO CONT Final result    Impression:  IMPRESSION:       There is small bowel obstruction without pneumatosis or portal venous gas or   free air with transition zone in the mid lower abdomen. 7 mm nodule left lower lobe follow-up as per Fleischner Society protocol. Small hiatal hernia       Colonic diverticulosis        Enlarged prostate with bladder diverticulum         Severe calcific atherosclerosis with stenosis at the origin of the vessels as   described. .       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    -High risk for lung cancer: CT at 6-12 months, then CT at 18-24 months. Narrative:  EXAM: CTA ABDOMEN AND PELVIS       INDICATION: Pain. COMPARISON: None       TECHNIQUE: Axial CT imaging from the dome of the diaphragms to the pubic   symphysis with and without intravenous contrast and with delayed images through   the abdomen and pelvis. Coronal and sagittal MIP reformats were generated. One   or more dose reduction techniques were used on this CT: automated exposure   control, adjustment of the mAs and/or kVp according to patient size, and   iterative reconstruction techniques.   The specific techniques used on this CT   exam have been documented in the patient's electronic medical record. Digital   Imaging and Communications in Medicine (DICOM) format image data are available   to nonaffiliated external healthcare facilities or entities on a secure, media   free, reciprocally searchable basis with patient authorization for at least a   12-month period after this study. _______________       FINDINGS:       VASCULATURE: Moderate to severe calcific atherosclerosis present. There is no   intramural hematoma. There is no aneurysmal dilatation the aorta. Postcontrast images demonstrate mild stenosis at the origin of the celiac artery   however remainder the celiac arteries patent. There is moderate stenosis at the   origin of the superior mesenteric artery. No definite filling defects seen in   the visualized portions of the superior mesenteric artery and the remainder the   superior mesenteric artery is patent. There is mild to moderate stenosis at the   origin of the renal arteries. There is moderate stenosis at the origin of the   inferior mesenteric artery. There is opacification the inferior mesenteric   artery. There is area of focal dissection of the proximal external iliac artery. Proximal femoral arteries are patent. Lung bases: There is an 7 mm noncalcified nodule left lung base. Follow-up as   per Fleischner Society protocol. Liver: Unremarkable. Cholecystectomy. No bile duct dilatation. Pancreas: Pancreas is mild to moderately atrophic. Otherwise unremarkable. Spleen: Unremarkable. Adrenals: Unremarkable. Kidneys: Small cyst seen in both kidneys otherwise kidneys demonstrate no   hydronephrosis. Lymph nodes: No enlarged lymph nodes. Bowel: There are dilated small bowel loops throughout the abdomen with   decompressed small bowel loops in the right lower quadrant. Findings consistent   with small bowel obstruction. The transition zone appears to be in the mid lower   abdomen.  There is no pneumatosis or portal venous gas or free air. There is colonic diverticulosis without diverticulitis. Appendix is normal.   Small hiatal hernia present. On the arterial phase of imaging there are prominent vessels along the rectum   and anus circumferentially. This is not apparent on the delayed images and   findings are suggestive of rectal varices/hemorrhoids. There is no convincing   evidence for intraluminal hemorrhage. Pelvic organs: There is a markedly enlarged prostate. There is bladder   diverticulum along the left lateral aspect of the urinary bladder. The urinary   bladder is otherwise unremarkable. There is no free fluid. OTHER: No acute or aggressive osseous abnormalities identified. There is   osteopenia. Multilevel degenerative disc disease present. There are degenerative   changes of both hips.       _______________               CXR Results  (Last 48 hours)    None            Medical Decision Making   I am the first provider for this patient. I reviewed the vital signs, available nursing notes, past medical history, past surgical history, family history and social history. Vital Signs-Reviewed the patient's vital signs. Pulse Oximetry Analysis -99 % on room air    Cardiac Monitor:  Rate: 70 bpm  Rhythm: Regular    Records Reviewed: Nursing Notes and Old Medical Records    Provider Notes:   66 y.o. male presenting with abdominal pain that has worsened over the last 2 days. On exam patient is afebrile with appropriate vital signs. However he appears moderately in distress with a protuberant abdomen, hyperactive bowel sounds that is generally tender to palpation. No rebound or guarding. Patient with a history of known stenosis of his inferior mesenteric artery. Will evaluate for SBO versus ischemic mesentery. Will obtain labs, and treat symptomatically. Procedures:  Procedures    ED Course:   4:00 PM   Initial assessment performed.  The patients presenting problems have been discussed, and they are in agreement with the care plan formulated and outlined with them. I have encouraged them to ask questions as they arise throughout their visit. ED Course as of May 29 1858   Wed May 29, 2019   1734 Code sepsis initiated. ABx initiated, will start 30cc/kg IVF, no hx of CHF and no LE edema concerning for fluid overload. [CA]      ED Course User Index  [CA] Cleopatra Navarro DO     6:30 PM  CT showing small bowel obstruction. Discussed patient's history, exam, and available diagnostics results with Jef Alvares MD, general surgeon, who agree with admission to service and recommends NG tube. Diagnosis and Disposition     6:58 PM  I have spent 40 minutes of critical care time involved in lab review, consultations with specialist, family decision-making, and documentation. During this entire length of time I was immediately available to the patient. Critical Care: The reason for providing this level of medical care for this critically ill patient was due a critical illness that impaired one or more vital organ systems such that there was a high probability of imminent or life threatening deterioration in the patients condition. This care involved high complexity decision making to assess, manipulate, and support vital system functions, to treat this degreee vital organ system failure and to prevent further life threatening deterioration of the patients condition. Core Measures:  For Hospitalized Patients:    1. Hospitalization Decision Time:  The decision to hospitalize the patient was made by Cleopatra Navarro DO at 7:00 PM on 5/29/2019    2. Aspirin: Aspirin was not given because the patient did not present with a stroke at the time of their Emergency Department evaluation    6:58 PM  Patient is being admitted to the hospital by Jef Alvares MD. The results of their tests and reasons for their admission have been discussed with them and/or available family. They convey agreement and understanding for the need to be admitted and for their admission diagnosis. CONDITIONS ON ADMISSION:  Sepsis is present at the time of admission. MRSA is not present at the time of admission. Wound infection is not present at the time of admission. Pressure Ulcer is not present at the time of admission. CLINICAL IMPRESSION:    1. SBO (small bowel obstruction) ([facility] [address]      ____________________________________     Please note that this dictation was completed with [employer], the computer voice recognition software. Quite often unanticipated grammatical, syntax, homophones, and other interpretive errors are inadvertently transcribed by the computer software. Please disregard these errors. Please excuse any errors that have escaped final proofreading.

## 2019-05-29 NOTE — ED TRIAGE NOTES
Patient arrives to ER from PCP office for c/o abdominal pain x 1 month w/ worsening pain x 2 days. Pt reports vomiting and diarrhea that began yesterday.

## 2019-05-29 NOTE — ROUTINE PROCESS
[facility] Medic Code Sepsis -  
- Time of RRT: N/A 
- Time of Code Sepsis: 3367 
- Team: 
Physician Dr. Lisa Antonio Bedside Nurse ROGERS Tilley Medic RRTM RICHARD Supervisor JUAN Rey 
- SIRS # 1 NONE  SIRS # 2 NONE 
- Possible source of infection: Abdomen - Organ dysfunction related to sepsis: LA > 2 
- Labs:  (pull in Chem 7 and CBC) - Initial Vitals: (pull in from [facility]) - Blood Cultures collected times 2 OR collected within last 24 hours:  1ST @ 1805, 2ND @ 1815 
- Lactic Acid Collection time OR within last 6 hours: 0092 - Antibiotic Start time: 1832 
- Lactic Acid Result: 2.2 
- Target Fluid Bolus Amount: 2,478 ML 
- Time of Fluid Bolus initiated: 8555 
- Reason for no target fluid bolus: N/A 
- New PIV / or line: -  Time of Fluid Bolus Completion: 
- Cardiopulmonary response after fluid completion: - Time of Repeat Lactic Acid: NOT populated yet - Repeat Lactic Acid Result: not populated yet - Repeat Vitals: (pull in from [facility]) - Vasopressors Needed? - Debriefed with RN Yes, no further help needed. - Additional Notes: - Transfer to higher level of care?

## 2019-05-29 NOTE — ED NOTES
Verbal report given to [facility] (name) on Griselda Johnson being transferred to [facility](unit) for routine progression of care    Report consisted of patient's Situation, Background, Assessment and Recommendations (SBAR)    Information from the following report(s)  SBAR, Kardex, ED Summary, Intake/Output, MAR and Recent Results was reviewed with the receiving nurse. Opportunity for questions and clarification was provided.     Patient transported with:  Tech    Last Filed Values:  Temp: 98.3 °F (36.8 °C) (05/29/19 1919)  Pulse (Heart Rate): 88 (05/29/19 1919)  Resp Rate: 22 (05/29/19 1919)  O2 Sat (%): 97 % (05/29/19 1919)  BP: 143/68 (05/29/19 1919)  MAP (Calculated): 93 (05/29/19 1919)  Level of Consciousness: Alert (05/29/19 1919)      Lab Results   Component Value Date/Time    WBC 11.9 05/29/2019 03:55 PM    Lactic acid 1.3 05/29/2019 06:05 PM    Lactic acid 2.2 (HH) 05/29/2019 03:55 PM       Repeat LA:  Time Due done    Blood Cultures Drawn:  yes    Fluid Resuscitation:  Total needed 2500, Status completed, amount 2500    All Antibiotics Started:  yes, Dose Due NA    VS x 2 post-fluid resuscitation:   yes    Vasopressor Infusion:  no NA    Provider Reassessment needed and notified:  no , Due not needed    Additional Interventions/Comments:  Pt stable and ready for transport to medical unit

## 2019-05-30 LAB
ALBUMIN SERPL-MCNC: 3 G/DL (ref 3.4–5)
ALBUMIN/GLOB SERPL: 1.1 {RATIO} (ref 0.8–1.7)
ALP SERPL-CCNC: 62 U/L (ref 45–117)
ALT SERPL-CCNC: 24 U/L (ref 16–61)
ANION GAP SERPL CALC-SCNC: 8 MMOL/L (ref 3–18)
AST SERPL-CCNC: 41 U/L (ref 15–37)
BASOPHILS # BLD: 0 K/UL (ref 0–0.1)
BASOPHILS NFR BLD: 0 % (ref 0–2)
BILIRUB SERPL-MCNC: 1.4 MG/DL (ref 0.2–1)
BUN SERPL-MCNC: 15 MG/DL (ref 7–18)
BUN/CREAT SERPL: 18 (ref 12–20)
CALCIUM SERPL-MCNC: 7.6 MG/DL (ref 8.5–10.1)
CHLORIDE SERPL-SCNC: 109 MMOL/L (ref 100–108)
CO2 SERPL-SCNC: 24 MMOL/L (ref 21–32)
CREAT SERPL-MCNC: 0.82 MG/DL (ref 0.6–1.3)
DIFFERENTIAL METHOD BLD: ABNORMAL
EOSINOPHIL # BLD: 0.1 K/UL (ref 0–0.4)
EOSINOPHIL NFR BLD: 2 % (ref 0–5)
ERYTHROCYTE [DISTWIDTH] IN BLOOD BY AUTOMATED COUNT: 13.3 % (ref 11.6–14.5)
GLOBULIN SER CALC-MCNC: 2.7 G/DL (ref 2–4)
GLUCOSE SERPL-MCNC: 111 MG/DL (ref 74–99)
HCT VFR BLD AUTO: 36.2 % (ref 36–48)
HGB BLD-MCNC: 11.7 G/DL (ref 13–16)
LYMPHOCYTES # BLD: 0.8 K/UL (ref 0.9–3.6)
LYMPHOCYTES NFR BLD: 9 % (ref 21–52)
MCH RBC QN AUTO: 27.7 PG (ref 24–34)
MCHC RBC AUTO-ENTMCNC: 32.3 G/DL (ref 31–37)
MCV RBC AUTO: 85.6 FL (ref 74–97)
MONOCYTES # BLD: 0.8 K/UL (ref 0.05–1.2)
MONOCYTES NFR BLD: 9 % (ref 3–10)
NEUTS SEG # BLD: 7 K/UL (ref 1.8–8)
NEUTS SEG NFR BLD: 80 % (ref 40–73)
PLATELET # BLD AUTO: 191 K/UL (ref 135–420)
PMV BLD AUTO: 9.8 FL (ref 9.2–11.8)
POTASSIUM SERPL-SCNC: 3.7 MMOL/L (ref 3.5–5.5)
PROT SERPL-MCNC: 5.7 G/DL (ref 6.4–8.2)
RBC # BLD AUTO: 4.23 M/UL (ref 4.7–5.5)
SODIUM SERPL-SCNC: 141 MMOL/L (ref 136–145)
WBC # BLD AUTO: 8.8 K/UL (ref 4.6–13.2)

## 2019-05-30 PROCEDURE — 65270000029 HC RM PRIVATE

## 2019-05-30 PROCEDURE — 85025 COMPLETE CBC W/AUTO DIFF WBC: CPT

## 2019-05-30 PROCEDURE — 80053 COMPREHEN METABOLIC PANEL: CPT

## 2019-05-30 PROCEDURE — 74011250636 HC RX REV CODE- 250/636: Performed by: EMERGENCY MEDICINE

## 2019-05-30 PROCEDURE — 77030033269 HC SLV COMPR SCD KNE2 CARD -B

## 2019-05-30 PROCEDURE — 36415 COLL VENOUS BLD VENIPUNCTURE: CPT

## 2019-05-30 PROCEDURE — 77030019563 HC DEV ATTCH FEED HOLL -A

## 2019-05-30 PROCEDURE — 74011250637 HC RX REV CODE- 250/637: Performed by: SURGERY

## 2019-05-30 PROCEDURE — 74011000258 HC RX REV CODE- 258: Performed by: EMERGENCY MEDICINE

## 2019-05-30 PROCEDURE — 74011250636 HC RX REV CODE- 250/636: Performed by: SURGERY

## 2019-05-30 RX ORDER — TAMSULOSIN HYDROCHLORIDE 0.4 MG/1
0.4 CAPSULE ORAL
Status: DISCONTINUED | OUTPATIENT
Start: 2019-05-30 | End: 2019-05-31 | Stop reason: HOSPADM

## 2019-05-30 RX ADMIN — PIPERACILLIN SODIUM,TAZOBACTAM SODIUM 3.38 G: 3; .375 INJECTION, POWDER, FOR SOLUTION INTRAVENOUS at 23:17

## 2019-05-30 RX ADMIN — PIPERACILLIN SODIUM,TAZOBACTAM SODIUM 3.38 G: 3; .375 INJECTION, POWDER, FOR SOLUTION INTRAVENOUS at 00:26

## 2019-05-30 RX ADMIN — PIPERACILLIN SODIUM,TAZOBACTAM SODIUM 3.38 G: 3; .375 INJECTION, POWDER, FOR SOLUTION INTRAVENOUS at 11:54

## 2019-05-30 RX ADMIN — PIPERACILLIN SODIUM,TAZOBACTAM SODIUM 3.38 G: 3; .375 INJECTION, POWDER, FOR SOLUTION INTRAVENOUS at 05:50

## 2019-05-30 RX ADMIN — POTASSIUM CHLORIDE: 2 INJECTION, SOLUTION, CONCENTRATE INTRAVENOUS at 12:47

## 2019-05-30 RX ADMIN — Medication 1 ENEMA: at 08:00

## 2019-05-30 RX ADMIN — HYDROMORPHONE HYDROCHLORIDE 1 MG: 1 INJECTION, SOLUTION INTRAMUSCULAR; INTRAVENOUS; SUBCUTANEOUS at 04:15

## 2019-05-30 RX ADMIN — TAMSULOSIN HYDROCHLORIDE 0.4 MG: 0.4 CAPSULE ORAL at 16:37

## 2019-05-30 RX ADMIN — PIPERACILLIN SODIUM,TAZOBACTAM SODIUM 3.38 G: 3; .375 INJECTION, POWDER, FOR SOLUTION INTRAVENOUS at 18:54

## 2019-05-30 RX ADMIN — SODIUM CHLORIDE 125 ML/HR: 900 INJECTION, SOLUTION INTRAVENOUS at 06:16

## 2019-05-30 RX ADMIN — POTASSIUM CHLORIDE: 2 INJECTION, SOLUTION, CONCENTRATE INTRAVENOUS at 23:16

## 2019-05-30 NOTE — PROGRESS NOTES
Reason for Admission:   SBO                   RRAT Score:   Low; 9                  Plan for utilizing home health:  TBD                        Current Advanced Directive/Advance Care Plan: not on file     Likelihood of Readmission:  TBD                         Transition of Care Plan:  Physician follow up vs [place] with physician follow up                     Chart reviewed. Per H&P  Anaya Parnell is a 66 y.o. male who is admitted for abd pain, CT shows possible SBO. Has hx of exp lap with SBO. Was sick on Monday and had N/V with diarrhea. \"    CM met with pt and his wife, Robb Soni ([phone]), at bedside. Pt is independent and does not have or use DME. Pt drives himself to and from appointments. Pt currently has an NGT in place. Please encourage ambulation when appropriate or order therapy services to assist with identifying potential transition of care needs. CM continuing to follow. Pt's wife would like provider to give her a call given she feels pt is not relaying information accurately. wife, Robb Soni ([phone]),     Care Management Interventions  PCP Verified by CM: Yes  Mode of Transport at Discharge:  Other (see comment)(spouse)  Transition of Care Consult (CM Consult): Discharge Planning  Health Maintenance Reviewed: Yes  Current Support Network: Lives with Spouse  Confirm Follow Up Transport: Self  Plan discussed with Pt/Family/Caregiver: Yes  Discharge Location  Discharge Placement: Home with family assistance(vs HH)'

## 2019-05-30 NOTE — H&P
History & Physical    Patient: Freda Ya MRN: [id number]  CSN: [id number]    YOB: 1940  Age: 66 y.o. Sex: male      DOA: 2019       HPI:     Freda Ya is a 66 y.o. male who is admitted for abd pain, CT shows possible SBO. Has hx of exp lap with SBO. Was sick on Monday and had N/V with diarrhea. Past Medical History:   Diagnosis Date    Bowel obstruction (HCC)     BPH (benign prostatic hyperplasia)     Burning with urination     Chest pain     Colon polyp     Elevated PSA     Enlarged prostate with lower urinary tract symptoms (LUTS)     Essential hypertension     Heart abnormalities     SOB (shortness of breath)     UTI (urinary tract infection)        Past Surgical History:   Procedure Laterality Date    HX CORONARY STENT PLACEMENT      HX HERNIA REPAIR      HX ORTHOPAEDIC      HX UROLOGICAL  2011    [facility]- R [place], Right Mid, Left [place], Left Mid, Left Base, biopsies all benign Dr. Tania Holden        History reviewed. No pertinent family history. Social History     Socioeconomic History    Marital status:      Spouse name: Not on file    Number of children: Not on file    Years of education: Not on file    Highest education level: Not on file   Tobacco Use    Smoking status: Former Smoker     Packs/day: 1.00     Years: 30.00     Pack years: 30.00     Types: Cigarettes     Last attempt to quit: 1970     Years since quittin.8    Smokeless tobacco: Never Used   Substance and Sexual Activity    Alcohol use: No    Drug use: No       Prior to Admission medications    Medication Sig Start Date End Date Taking? Authorizing Provider   tamsulosin (FLOMAX) 0.4 mg capsule Take 1 Cap by mouth daily (after dinner). 19  Yes Farhan Pedro MD   finasteride (PROSCAR) 5 mg tablet TAKE 1 TABLET DAILY 19  Yes Delvin Gutierrez MD   aspirin delayed-release 81 mg tablet Take 1 Tab by mouth.    Yes Provider, Historical   lisinopril (PRINIVIL, ZESTRIL) 10 mg tablet  6/15/17  Yes Provider, Historical   atorvastatin (LIPITOR) 40 mg tablet  11/2/15  Yes Provider, Historical   metoprolol (LOPRESSOR) 50 mg tablet Take 25 mg by mouth two (2) times a day. Yes Other, MD Fredi   pantoprazole (PROTONIX) 40 mg tablet Take 40 mg by mouth daily. Yes Provider, Historical   nitroglycerin (NITROSTAT) 0.4 mg SL tablet by SubLINGual route every five (5) minutes as needed for Chest Pain. Provider, Historical       Allergies   Allergen Reactions    Naproxen Other (comments) and Unknown (comments)     Per pt it \"attacks my prostate\"    Nsaids (Non-Steroidal Anti-Inflammatory Drug) Other (comments)     Prostate swelling       Review of Systems  A comprehensive review of systems was negative except for that written in the History of Present Illness. Physical Exam:      Visit Vitals  /46 (BP 1 Location: Left arm, BP Patient Position: At rest)   Pulse 70   Temp 98 °F (36.7 °C)   Resp 16   Ht 5' 10\" (1.778 m)   Wt 82.6 kg (182 lb)   SpO2 98%   BMI 26.11 kg/m²       Physical Exam:  Physical Exam:   General:  Alert, cooperative, no distress, appears stated age. Eyes:  Conjunctivae/corneas clear. PERRL, EOMs intact. Fundi benign   Ears:  Normal TMs and external ear canals both ears. Nose: Nares normal. Septum midline. Mucosa normal. No drainage or sinus tenderness. Mouth/Throat: Lips, mucosa, and tongue normal. Teeth and gums normal.   Neck: Supple, symmetrical, trachea midline, no adenopathy, thyroid: no enlargement/tenderness/nodules, no carotid bruit and no JVD. Back:   Symmetric, no curvature. ROM normal. No CVA tenderness. Lungs:   Clear to auscultation bilaterally. Heart:  Regular rate and rhythm, S1, S2 normal, no murmur, click, rub or gallop. Abdomen:   Soft, non-tender. Bowel sounds normal. No masses,  No organomegaly. distended   Extremities: Extremities normal, atraumatic, no cyanosis or edema. Pulses: 2+ and symmetric all extremities. Skin: Skin color, texture, turgor normal. No rashes or lesions   Lymph nodes: Cervical, supraclavicular, and axillary nodes normal.   Neurologic: CNII-XII intact. Normal strength, sensation and reflexes throughout. Labs Reviewed: All lab results for the last 24 hours reviewed. Assessment/Plan     Active Problems:    SBO (small bowel obstruction) ([facility] (5/29/2019)        Will place NG , Abd films in AM, IVF.

## 2019-05-30 NOTE — ROUTINE PROCESS
.Bedside and Verbal shift change report given to OSWALDO Damon RN (oncoming nurse) by Analia Dee (offgoing nurse). Report included the following information SBAR, Kardex, Intake/Output and MAR.

## 2019-05-30 NOTE — PROGRESS NOTES
TRANSFER - IN REPORT:    Verbal report received from  Immanuel Reyez on Moody Taylor  being received from ED unit) for routine progression of care      Report consisted of patients Situation, Background, Assessment and   Recommendations(SBAR). Information from the following report(s) SBAR, Kardex, ED Summary, Intake/Output, MAR, Accordion, Cardiac Rhythm NSR and Quality Measures was reviewed with the receiving nurse. Opportunity for questions and clarification was provided. Patient arrived to unit at this time. Assessment completed upon patients arrival to unit and care assumed. Patient denies any distress or pain at this time.

## 2019-05-30 NOTE — PROGRESS NOTES
INITIAL NUTRITION ASSESSMENT     RECOMMENDATIONS/PLAN:   EN/PN: If unable to adv diet nutrition support maybe necessary;   Diet: Adv diet per MD  Avoid prolonged NPO diet order as it does not meet estimated needs (Day 1 NPO)  Monitor labs/lytes, diet adv, skin integrity, wt, fluid status, BM    REASON FOR ASSESSMENT:     []  RN Referral:    [x] MST score >/=2  Malnutrition Screening Tool (MST):  Recently Lost Weight Without Trying: No  If Yes, How Much Weight Loss: Unsure     MST Score: 2      NUTRITION ASSESSMENT:   Client History: 66 yrs old Male admitted with vomiting, chest pain, NG in place on suction      PMHx: SBO, BPH, burning w/ urination, chest pain, colon polyp, elevated PSA, enlarge prostate w/ LUTS, essential HTN, heart abnormalities, SOB, UTI   Cultural/Muslim Food Preferences: None Identified    FOOD/NUTRITION HISTORY  Diet History: pt was eating well prior to poor appetite that started on Monday; pt has had 2 other similar episodes in the past, but has never had PN/EN before with other attacks    Food Allergies:  [x] NKFA       Pertinent PTA Medications: lopressor, protonix      NUTRITION INTAKE   Diet Order:  NPO      Average PO Intake:       Patient Vitals for the past 100 hrs:   % Diet Eaten   05/30/19 0617 0 %      Pertinent Medications:  [x] Reviewed; d5%,   Electrolyte Replacement Protocol: []K  []Mg  []PO4    Insulin:  [] SSI  [] Pre-meal   []  Basal   [] Drip  [x] None  Pt expected to meet estimated nutrient needs through next review:          []  Yes     [x] No; NPO does not meet estimated needs ANTHROPOMETRICS  Height: 5' 10\" (177.8 cm)       Weight: 82.6 kg (182 lb)    BMI: 26.1 kg/m^2  -  overweight (25.0%-29.9% BMI)        Weight change: no significant wt loss noted in chart hx pt was 189# on 11/28/18                                  Comparison to Reference Standards:  IBW: 166 lbs      %IBW: 110%      AdjBW: n/a    NUTRITION-FOCUSED PHYSICAL ASSESSMENT  Skin: No PU.      GI: No BM    BIOCHEMICAL DATA & MEDICAL TESTS  Pertinent Labs:  [x] Reviewed;      NUTRITION PRESCRIPTION  Calories: 2151 kcal/day based on Page x 1.4  Protein:  g/day based on 1.0-1.3 g/kg  CHO: 269 g/day based on 50% of total energy  Fluid: 2151 ml/day based on 1 kcal/ml      NUTRITION DIAGNOSES:   1. At risk for inadequate oral intake related to NPO diet order as evidence by day 1 NPO    NUTRITION INTERVENTIONS:   INTERVENTIONS:        GOALS:  1. EN/PN: If unable to adv diet nutrition support maybe necessary;   Diet: Adv diet per MD 1. Adv diet per MD by next review 4 days     LEARNING NEEDS (Diet, Supplementation, Food/Nutrient-Drug Interaction):   [] None Identified  [] Inpatient education provided/documented    [] Identified and patient:  [] Declined     [x] Was not appropriate/indicated  NUTRITION MONITORING /EVALUATION:   Monitor wt  Monitor renal labs, electrolytes, fluid status    [] Participated in Interdisciplinary Rounds  [x] Interdisciplinary Care Plan Reviewed/Documented  DISCHARGE NUTRITION RECOMMENDATIONS ADDRESSED:      [x] To be determined closer to discharge    NUTRITION RISK:     [x]  At risk                     []  Not currently at risk     Will follow-up per policy.   Julia Russell 1

## 2019-05-30 NOTE — PROGRESS NOTES
Shift Summary:  Patient required Dilaudid 1 mg IV for pain to abdomen rating at 7/10, which provided relief. NG tube intact 200 ml of greenish drainage overnight. Abdomen remains distended, but patient states more comfortable. Bowel sounds hypoactive.     Patient Vitals for the past 8 hrs:   Temp Pulse Resp BP SpO2   05/30/19 0348 98.1 °F (36.7 °C) 82 22 103/54 97 %   05/29/19 2230 98 °F (36.7 °C) 90 22 123/62 95 %

## 2019-05-30 NOTE — PROGRESS NOTES
0- Received bedside report from Aarti Oh. Patient in bed resting with NG tube with suction. Patient alert and oriented. 1500- Paged Dr. Annamaria Caruso to ask about orders for Flomax and DVT prophylaxis. Also, this nurse will inform Dr. Annamaria Caruso of small hard bowel movements post-enema and that his wife would like to speak to him. Dr. Annamaria Caruso gave verbal orders for SCDs and Flomax 1tab daily. SUMMARY- Patient insisted on giving self fleet enema. Patient successfully gave self enema with small and hard bowel movement afterwards. Patient refused SCDs while awake and stated he will walk around room. Patient agreed to wear them when he goes to sleep. Both patient's IVs infiltrated and he had a new one placed by medic. Bedside and Verbal shift change report given to [address] (oncoming nurse) by González Crane RN (offgoing nurse). Report included the following information SBAR, Kardex, Intake/Output, MAR and Recent Results.

## 2019-05-31 VITALS
TEMPERATURE: 97.6 F | SYSTOLIC BLOOD PRESSURE: 158 MMHG | BODY MASS INDEX: 27.37 KG/M2 | HEIGHT: 70 IN | OXYGEN SATURATION: 97 % | HEART RATE: 87 BPM | RESPIRATION RATE: 18 BRPM | WEIGHT: 191.2 LBS | DIASTOLIC BLOOD PRESSURE: 60 MMHG

## 2019-05-31 LAB
ANION GAP SERPL CALC-SCNC: 9 MMOL/L (ref 3–18)
BASOPHILS # BLD: 0 K/UL (ref 0–0.1)
BASOPHILS NFR BLD: 0 % (ref 0–2)
BUN SERPL-MCNC: 13 MG/DL (ref 7–18)
BUN/CREAT SERPL: 15 (ref 12–20)
CALCIUM SERPL-MCNC: 7.8 MG/DL (ref 8.5–10.1)
CHLORIDE SERPL-SCNC: 109 MMOL/L (ref 100–108)
CO2 SERPL-SCNC: 26 MMOL/L (ref 21–32)
CREAT SERPL-MCNC: 0.87 MG/DL (ref 0.6–1.3)
DIFFERENTIAL METHOD BLD: ABNORMAL
EOSINOPHIL # BLD: 0.2 K/UL (ref 0–0.4)
EOSINOPHIL NFR BLD: 3 % (ref 0–5)
ERYTHROCYTE [DISTWIDTH] IN BLOOD BY AUTOMATED COUNT: 13.3 % (ref 11.6–14.5)
GLUCOSE SERPL-MCNC: 116 MG/DL (ref 74–99)
HCT VFR BLD AUTO: 35.4 % (ref 36–48)
HGB BLD-MCNC: 11.4 G/DL (ref 13–16)
LYMPHOCYTES # BLD: 1.1 K/UL (ref 0.9–3.6)
LYMPHOCYTES NFR BLD: 14 % (ref 21–52)
MCH RBC QN AUTO: 27.8 PG (ref 24–34)
MCHC RBC AUTO-ENTMCNC: 32.2 G/DL (ref 31–37)
MCV RBC AUTO: 86.3 FL (ref 74–97)
MONOCYTES # BLD: 0.8 K/UL (ref 0.05–1.2)
MONOCYTES NFR BLD: 10 % (ref 3–10)
NEUTS SEG # BLD: 5.8 K/UL (ref 1.8–8)
NEUTS SEG NFR BLD: 73 % (ref 40–73)
PLATELET # BLD AUTO: 168 K/UL (ref 135–420)
PMV BLD AUTO: 9.4 FL (ref 9.2–11.8)
POTASSIUM SERPL-SCNC: 3.6 MMOL/L (ref 3.5–5.5)
RBC # BLD AUTO: 4.1 M/UL (ref 4.7–5.5)
SODIUM SERPL-SCNC: 144 MMOL/L (ref 136–145)
WBC # BLD AUTO: 7.9 K/UL (ref 4.6–13.2)

## 2019-05-31 PROCEDURE — 85025 COMPLETE CBC W/AUTO DIFF WBC: CPT

## 2019-05-31 PROCEDURE — 74011000258 HC RX REV CODE- 258: Performed by: EMERGENCY MEDICINE

## 2019-05-31 PROCEDURE — 80048 BASIC METABOLIC PNL TOTAL CA: CPT

## 2019-05-31 PROCEDURE — 74011250636 HC RX REV CODE- 250/636: Performed by: EMERGENCY MEDICINE

## 2019-05-31 PROCEDURE — 36415 COLL VENOUS BLD VENIPUNCTURE: CPT

## 2019-05-31 RX ADMIN — Medication 10 ML: at 05:33

## 2019-05-31 RX ADMIN — PIPERACILLIN SODIUM,TAZOBACTAM SODIUM 3.38 G: 3; .375 INJECTION, POWDER, FOR SOLUTION INTRAVENOUS at 05:33

## 2019-05-31 NOTE — PROGRESS NOTES
AFVSS Having BM's  Feels much better with no pain. Abd - soft , NT, less distended    Will d/c NG  Diet. If OK can try to D/C later today.

## 2019-05-31 NOTE — PROGRESS NOTES
Noted pt is to be discharged today. Pt has been ambulating in the halls independently. No transition of care needs have been identified. Pt's wife will transport pt home today. Care Management Interventions  PCP Verified by CM: Yes  Mode of Transport at Discharge:  Other (see comment)(spouse)  Transition of Care Consult (CM Consult): Discharge Planning  Health Maintenance Reviewed: Yes  Current Support Network: Lives with Spouse  Confirm Follow Up Transport: Self  Plan discussed with Pt/Family/Caregiver: Yes  Discharge Location  Discharge Placement: Home with family assistance(vs )

## 2019-05-31 NOTE — PROGRESS NOTES
0900 - Assumed care of patient. Assessment completed as per flowsheet. NG tube removed. 1015 - Patient sitting on edge of bed. Denies any pain/discomfort. 1050 - Patient noted ambulating in hallway. Reports have a small soft BM.

## 2019-05-31 NOTE — DISCHARGE INSTRUCTIONS
Patient Education        Bowel Blockage (Intestinal Obstruction): Care Instructions  Your Care Instructions  A bowel blockage, also called an intestinal obstruction, can prevent gas, fluids, or solids from moving through the intestines normally. It can cause constipation and, rarely, diarrhea. You may have pain, nausea, vomiting, and cramping. Most of the time, complete blockages require a stay in the hospital and possibly surgery. But if your bowel is only partly blocked, your doctor may tell you to wait until it clears on its own and you are able to pass gas and stool. If so, there are things you can do at home to help make you feel better. If you have had surgery for a bowel blockage, there are things you can do at home to make sure you heal well. You can also make some changes to keep your bowel from becoming blocked again. Follow-up care is a key part of your treatment and safety. Be sure to make and go to all appointments, and call your doctor if you are having problems. It's also a good idea to know your test results and keep a list of the medicines you take. How can you care for yourself at home? If your doctor has told you to wait at home for a blockage to clear on its own:  · Follow your doctor's instructions. These may include eating a liquid diet to avoid complete blockage. · Be safe with medicines. Take your medicines exactly as prescribed. Call your doctor if you think you are having a problem with your medicine. · Put a heating pad set on low on your belly to relieve mild cramps and pain. To prevent another blockage  · Try to eat smaller amounts of food more often. For example, have 5 or 6 small meals throughout the day instead of 2 or 3 large meals. · Chew your food very well. Try to chew each bite about 20 times or until it is liquid. · Avoid high-fiber foods and raw fruits and vegetables with skins, husks, strings, or seeds.  These can form a ball of undigested material that can cause a blockage if a part of your bowel is scarred or narrowed. · Check with your doctor before you eat whole-grain products or use a fiber supplement such as Citrucel or Metamucil. · To help you have regular bowel movements, eat at regular times, do not strain during a bowel movement, and drink at least 8 to 10 glasses of water each day. If you have kidney, heart, or liver disease and have to limit fluids, talk with your doctor or before you increase the amount of fluids you drink. · Drink high-calorie liquid formulas if your doctor says to. Severe symptoms may make it hard for your body to take in vitamins and minerals. · Get regular exercise. It helps you digest your food better. Get at least 30 minutes of physical activity on most days of the week. Walking is a good choice. When should you call for help? Call your doctor now or seek immediate medical care if:    · You have a fever.     · You are vomiting.     · You have new or worse belly pain.     · You cannot pass stools or gas.    Watch closely for changes in your health, and be sure to contact your doctor if you have any problems. Where can you learn more? Go to http://giovanny-radha.info/. Enter X669 in the search box to learn more about \"Bowel Blockage (Intestinal Obstruction): Care Instructions. \"  Current as of: March 27, 2018  Content Version: 11.9  © 8869-7266 [employer]. Care instructions adapted under license by [employer] (which disclaims liability or warranty for this information). If you have questions about a medical condition or this instruction, always ask your healthcare professional. [place] [address] any warranty or liability for your use of this information.          DISCHARGE SUMMARY from Nurse    PATIENT INSTRUCTIONS:    After general anesthesia or intravenous sedation, for 24 hours or while taking prescription Narcotics:  · Limit your activities  · Do not drive and operate hazardous machinery  · Do not make important personal or business decisions  · Do  not drink alcoholic beverages  · If you have not urinated within 8 hours after discharge, please contact your surgeon on call. Report the following to your surgeon:  · Excessive pain, swelling, redness or odor of or around the surgical area  · Temperature over 100.5  · Nausea and vomiting lasting longer than 4 hours or if unable to take medications  · Any signs of decreased circulation or nerve impairment to extremity: change in color, persistent  numbness, tingling, coldness or increase pain  · Any questions    What to do at Home:  Recommended activity: Activity as tolerated,   . *  Please give a list of your current medications to your Primary Care Provider. *  Please update this list whenever your medications are discontinued, doses are      changed, or new medications (including over-the-counter products) are added. *  Please carry medication information at all times in case of emergency situations. These are general instructions for a healthy lifestyle:    No smoking/ No tobacco products/ Avoid exposure to second hand smoke  Surgeon General's Warning:  Quitting smoking now greatly reduces serious risk to your health. Obesity, smoking, and sedentary lifestyle greatly increases your risk for illness    A healthy diet, regular physical exercise & weight monitoring are important for maintaining a healthy lifestyle    You may be retaining fluid if you have a history of heart failure or if you experience any of the following symptoms:  Weight gain of 3 pounds or more overnight or 5 pounds in a week, increased swelling in our hands or feet or shortness of breath while lying flat in bed. Please call your doctor as soon as you notice any of these symptoms; do not wait until your next office visit.     Recognize signs and symptoms of STROKE:    F-face looks uneven    A-arms unable to move or move unevenly    S-speech slurred or non-existent    T-time-call 911 as soon as signs and symptoms begin-DO NOT go       Back to bed or wait to see if you get better-TIME IS BRAIN. Warning Signs of HEART ATTACK     Call 911 if you have these symptoms:   Chest discomfort. Most heart attacks involve discomfort in the center of the chest that lasts more than a few minutes, or that goes away and comes back. It can feel like uncomfortable pressure, squeezing, fullness, or pain.  Discomfort in other areas of the upper body. Symptoms can include pain or discomfort in one or both arms, the back, neck, jaw, or stomach.  Shortness of breath with or without chest discomfort.  Other signs may include breaking out in a cold sweat, nausea, or lightheadedness. Don't wait more than five minutes to call 911 - MINUTES MATTER! Fast action can save your life. Calling 911 is almost always the fastest way to get lifesaving treatment. Emergency Medical Services staff can begin treatment when they arrive -- up to an hour sooner than if someone gets to the hospital by car. The discharge information has been reviewed with the patient. The patient verbalized understanding. Discharge medications reviewed with the patient and appropriate educational materials and side effects teaching were provided.   ___________________________________________________________________________________________________________________________________

## 2019-05-31 NOTE — ROUTINE PROCESS
Dual AVS reviewed with Lesli Doshi RN. All medications reviewed individually with patient. Opportunities for questions and concerns provided. Patient verbalized understanding and verified by teachback. IV discontinued, no redness, swelling or pain noted. Patient awaiting granddaughter for transportation home, with an ETA of 15 min. Patient discharged via (mode of transport ie. Car, ambulance or air transport) car. Patient's arm band appropriately discarded.

## 2019-05-31 NOTE — PROGRESS NOTES
Problem: Falls - Risk of  Goal: *Absence of Falls  Description  Document Misty Linoons Fall Risk and appropriate interventions in the flowsheet.   Outcome: Resolved/Met     Problem: Patient Education: Go to Patient Education Activity  Goal: Patient/Family Education  Outcome: Resolved/Met

## 2019-06-04 LAB
BACTERIA SPEC CULT: NORMAL
BACTERIA SPEC CULT: NORMAL
SERVICE CMNT-IMP: NORMAL
SERVICE CMNT-IMP: NORMAL

## 2019-06-06 NOTE — DISCHARGE SUMMARY
Discharge Summary    Patient: Glen Mejia MRN: [id number]  CSN: [id number]    YOB: 1940  Age: 66 y.o. Sex: male    DOA: 5/29/2019 LOS:  LOS: 2 days   Discharge Date: 5/31/2019     Admission Diagnoses: SBO (small bowel obstruction) ([facility] [N76.687]    Discharge Diagnoses:    Problem List as of 5/31/2019 Date Reviewed: 8/16/2018          Codes Class Noted - Resolved    SBO (small bowel obstruction) ([facility] ICD-10-CM: D17.587  ICD-9-CM: 560.9  5/29/2019 - Present        Enlarged prostate with lower urinary tract symptoms (LUTS) ICD-10-CM: N40.1  ICD-9-CM: 600.01  12/3/2015 - Present        Hypertension (Chronic) ICD-10-CM: I10  ICD-9-CM: 401.9  3/17/2013 - Present        Coronary artery disease (Chronic) ICD-10-CM: I25.10  ICD-9-CM: 414.00  3/17/2013 - Present        Microscopic hematuria ICD-10-CM: R31.29  ICD-9-CM: 599.72  3/17/2013 - Present        RESOLVED: Small bowel obstruction due to adhesions [facility]) ICD-10-CM: K56.50  ICD-9-CM: 560.81  3/17/2013 - 3/20/2013        Urinary hesitancy ICD-10-CM: R39.11  ICD-9-CM: 788.64  5/3/2018 - Present        Elevated PSA ICD-10-CM: R97.20  ICD-9-CM: 790.93  1/14/2012 - Present        RESOLVED: Recurrent UTI ICD-10-CM: N39.0  ICD-9-CM: 599.0  7/15/2014 - 2/15/2018        RESOLVED: Postop Urinary Retention ICD-10-CM: R33.8  ICD-9-CM: 788.29  7/15/2014 - 12/3/2015        RESOLVED: Transient Urinary Retention ICD-10-CM: R33.8  ICD-9-CM: 788.29  5/2/2013 - 5/10/2013        Urinary frequency ICD-10-CM: R35.0  ICD-9-CM: 788.41  11/14/2013 - Present        Incomplete emptying of bladder ICD-10-CM: R33.9  ICD-9-CM: 788.21  1/14/2012 - Present        Family hx of prostate cancer ICD-10-CM: Z80.42  ICD-9-CM: V16.42  6/4/2015 - Present        History of tobacco use ICD-10-CM: Z87.891  ICD-9-CM: V15.82  7/24/2012 - Present              Discharge Condition: Good    Hospital Course: Admittted for SBO resolved with NG suction.     Consults: None    Significant Diagnostic Studies: CT    Discharge Medications:   Cannot display discharge medications since this patient is not currently admitted.       Activity: Activity as tolerated    Diet: Regular Diet    Wound Care: None needed    Follow-up: PRN

## 2020-03-12 NOTE — ED NOTES
Unable to obtain blood cultures, phlebotomy called and requested to attempt blood draw. Benzoyl Peroxide Counseling: Patient counseled that medicine may cause skin irritation and bleach clothing.  In the event of skin irritation, the patient was advised to reduce the amount of the drug applied or use it less frequently.   The patient verbalized understanding of the proper use and possible adverse effects of benzoyl peroxide.  All of the patient's questions and concerns were addressed. Spironolactone Pregnancy And Lactation Text: This medication can cause feminization of the male fetus and should be avoided during pregnancy. The active metabolite is also found in breast milk. Azithromycin Counseling:  I discussed with the patient the risks of azithromycin including but not limited to GI upset, allergic reaction, drug rash, diarrhea, and yeast infections. Tazorac Pregnancy And Lactation Text: This medication is not safe during pregnancy. It is unknown if this medication is excreted in breast milk. Detail Level: Zone Dapsone Counseling: I discussed with the patient the risks of dapsone including but not limited to hemolytic anemia, agranulocytosis, rashes, methemoglobinemia, kidney failure, peripheral neuropathy, headaches, GI upset, and liver toxicity.  Patients who start dapsone require monitoring including baseline LFTs and weekly CBCs for the first month, then every month thereafter.  The patient verbalized understanding of the proper use and possible adverse effects of dapsone.  All of the patient's questions and concerns were addressed. Doxycycline Counseling:  Patient counseled regarding possible photosensitivity and increased risk for sunburn.  Patient instructed to avoid sunlight, if possible.  When exposed to sunlight, patients should wear protective clothing, sunglasses, and sunscreen.  The patient was instructed to call the office immediately if the following severe adverse effects occur:  hearing changes, easy bruising/bleeding, severe headache, or vision changes.  The patient verbalized understanding of the proper use and possible adverse effects of doxycycline.  All of the patient's questions and concerns were addressed. Tetracycline Counseling: Patient counseled regarding possible photosensitivity and increased risk for sunburn.  Patient instructed to avoid sunlight, if possible.  When exposed to sunlight, patients should wear protective clothing, sunglasses, and sunscreen.  The patient was instructed to call the office immediately if the following severe adverse effects occur:  hearing changes, easy bruising/bleeding, severe headache, or vision changes.  The patient verbalized understanding of the proper use and possible adverse effects of tetracycline.  All of the patient's questions and concerns were addressed. Patient understands to avoid pregnancy while on therapy due to potential birth defects. Topical Sulfur Applications Counseling: Topical Sulfur Counseling: Patient counseled that this medication may cause skin irritation or allergic reactions.  In the event of skin irritation, the patient was advised to reduce the amount of the drug applied or use it less frequently.   The patient verbalized understanding of the proper use and possible adverse effects of topical sulfur application.  All of the patient's questions and concerns were addressed. High Dose Vitamin A Pregnancy And Lactation Text: High dose vitamin A therapy is contraindicated during pregnancy and breast feeding. Spironolactone Counseling: Patient advised regarding risks of diarrhea, abdominal pain, hyperkalemia, birth defects (for female patients), liver toxicity and renal toxicity. The patient may need blood work to monitor liver and kidney function and potassium levels while on therapy. The patient verbalized understanding of the proper use and possible adverse effects of spironolactone.  All of the patient's questions and concerns were addressed. Include Pregnancy/Lactation Warning?: No Dapsone Pregnancy And Lactation Text: This medication is Pregnancy Category C and is not considered safe during pregnancy or breast feeding. Erythromycin Pregnancy And Lactation Text: This medication is Pregnancy Category B and is considered safe during pregnancy. It is also excreted in breast milk. Azithromycin Pregnancy And Lactation Text: This medication is considered safe during pregnancy and is also secreted in breast milk. Bactrim Pregnancy And Lactation Text: This medication is Pregnancy Category D and is known to cause fetal risk.  It is also excreted in breast milk. Topical Clindamycin Counseling: Patient counseled that this medication may cause skin irritation or allergic reactions.  In the event of skin irritation, the patient was advised to reduce the amount of the drug applied or use it less frequently.   The patient verbalized understanding of the proper use and possible adverse effects of clindamycin.  All of the patient's questions and concerns were addressed. Topical Retinoid Pregnancy And Lactation Text: This medication is Pregnancy Category C. It is unknown if this medication is excreted in breast milk. Doxycycline Pregnancy And Lactation Text: This medication is Pregnancy Category D and not consider safe during pregnancy. It is also excreted in breast milk but is considered safe for shorter treatment courses. Tazorac Counseling:  Patient advised that medication is irritating and drying.  Patient may need to apply sparingly and wash off after an hour before eventually leaving it on overnight.  The patient verbalized understanding of the proper use and possible adverse effects of tazorac.  All of the patient's questions and concerns were addressed. Tetracycline Pregnancy And Lactation Text: This medication is Pregnancy Category D and not consider safe during pregnancy. It is also excreted in breast milk. Erythromycin Counseling:  I discussed with the patient the risks of erythromycin including but not limited to GI upset, allergic reaction, drug rash, diarrhea, increase in liver enzymes, and yeast infections. Birth Control Pills Counseling: Birth Control Pill Counseling: I discussed with the patient the potential side effects of OCPs including but not limited to increased risk of stroke, heart attack, thrombophlebitis, deep venous thrombosis, hepatic adenomas, breast changes, GI upset, headaches, and depression.  The patient verbalized understanding of the proper use and possible adverse effects of OCPs. All of the patient's questions and concerns were addressed. Topical Clindamycin Pregnancy And Lactation Text: This medication is Pregnancy Category B and is considered safe during pregnancy. It is unknown if it is excreted in breast milk. Benzoyl Peroxide Pregnancy And Lactation Text: This medication is Pregnancy Category C. It is unknown if benzoyl peroxide is excreted in breast milk. Isotretinoin Counseling: Patient should get monthly blood tests, not donate blood, not drive at night if vision affected, not share medication, and not undergo elective surgery for 6 months after tx completed. Side effects reviewed, pt to contact office should one occur. Bactrim Counseling:  I discussed with the patient the risks of sulfa antibiotics including but not limited to GI upset, allergic reaction, drug rash, diarrhea, dizziness, photosensitivity, and yeast infections.  Rarely, more serious reactions can occur including but not limited to aplastic anemia, agranulocytosis, methemoglobinemia, blood dyscrasias, liver or kidney failure, lung infiltrates or desquamative/blistering drug rashes. High Dose Vitamin A Counseling: Side effects reviewed, pt to contact office should one occur. Minocycline Counseling: Patient advised regarding possible photosensitivity and discoloration of the teeth, skin, lips, tongue and gums.  Patient instructed to avoid sunlight, if possible.  When exposed to sunlight, patients should wear protective clothing, sunglasses, and sunscreen.  The patient was instructed to call the office immediately if the following severe adverse effects occur:  hearing changes, easy bruising/bleeding, severe headache, or vision changes.  The patient verbalized understanding of the proper use and possible adverse effects of minocycline.  All of the patient's questions and concerns were addressed. Birth Control Pills Pregnancy And Lactation Text: This medication should be avoided if pregnant and for the first 30 days post-partum. Isotretinoin Pregnancy And Lactation Text: This medication is Pregnancy Category X and is considered extremely dangerous during pregnancy. It is unknown if it is excreted in breast milk. Topical Sulfur Applications Pregnancy And Lactation Text: This medication is Pregnancy Category C and has an unknown safety profile during pregnancy. It is unknown if this topical medication is excreted in breast milk. Topical Retinoid counseling:  Patient advised to apply a pea-sized amount only at bedtime and wait 30 minutes after washing their face before applying.  If too drying, patient may add a non-comedogenic moisturizer. The patient verbalized understanding of the proper use and possible adverse effects of retinoids.  All of the patient's questions and concerns were addressed.

## 2020-03-30 PROBLEM — R30.0 DYSURIA: Status: ACTIVE | Noted: 2020-03-30

## 2020-04-17 PROBLEM — N45.1 EPIDIDYMITIS, RIGHT: Status: ACTIVE | Noted: 2020-04-17

## 2020-04-17 PROBLEM — N50.819 TESTICLE PAIN: Status: ACTIVE | Noted: 2020-04-17

## 2020-10-22 ENCOUNTER — HOSPITAL ENCOUNTER (EMERGENCY)
Age: 80
Discharge: HOME | End: 2020-10-22
Attending: EMERGENCY MEDICINE
Payer: MEDICARE

## 2020-10-22 ENCOUNTER — APPOINTMENT (OUTPATIENT)
Dept: GENERAL RADIOLOGY | Age: 80
End: 2020-10-22
Attending: PHYSICIAN ASSISTANT
Payer: MEDICARE

## 2020-10-22 VITALS
TEMPERATURE: 98.2 F | HEART RATE: 73 BPM | DIASTOLIC BLOOD PRESSURE: 55 MMHG | BODY MASS INDEX: 31.15 KG/M2 | RESPIRATION RATE: 26 BRPM | OXYGEN SATURATION: 98 % | HEIGHT: 72 IN | SYSTOLIC BLOOD PRESSURE: 134 MMHG | WEIGHT: 230 LBS

## 2020-10-22 DIAGNOSIS — M75.92 SUPRASPINATUS TENDINITIS, LEFT: Primary | ICD-10-CM

## 2020-10-22 LAB
ALBUMIN SERPL-MCNC: 3.7 G/DL (ref 3.4–5)
ALBUMIN/GLOB SERPL: 1 {RATIO} (ref 0.8–1.7)
ALP SERPL-CCNC: 75 U/L (ref 45–117)
ALT SERPL-CCNC: 18 U/L (ref 16–61)
ANION GAP SERPL CALC-SCNC: 1 MMOL/L (ref 3–18)
AST SERPL-CCNC: 14 U/L (ref 10–38)
ATRIAL RATE: 77 BPM
BASOPHILS # BLD: 0 K/UL (ref 0–0.1)
BASOPHILS NFR BLD: 0 % (ref 0–2)
BILIRUB SERPL-MCNC: 0.6 MG/DL (ref 0.2–1)
BUN SERPL-MCNC: 11 MG/DL (ref 7–18)
BUN/CREAT SERPL: 11 (ref 12–20)
CALCIUM SERPL-MCNC: 8.5 MG/DL (ref 8.5–10.1)
CALCULATED P AXIS, ECG09: 63 DEGREES
CALCULATED R AXIS, ECG10: 68 DEGREES
CALCULATED T AXIS, ECG11: 65 DEGREES
CHLORIDE SERPL-SCNC: 107 MMOL/L (ref 100–111)
CK MB CFR SERPL CALC: 0.9 % (ref 0–4)
CK MB SERPL-MCNC: 1.5 NG/ML (ref 5–25)
CK SERPL-CCNC: 174 U/L (ref 39–308)
CO2 SERPL-SCNC: 31 MMOL/L (ref 21–32)
CREAT SERPL-MCNC: 1 MG/DL (ref 0.6–1.3)
DIAGNOSIS, 93000: NORMAL
DIFFERENTIAL METHOD BLD: ABNORMAL
EOSINOPHIL # BLD: 0.1 K/UL (ref 0–0.4)
EOSINOPHIL NFR BLD: 1 % (ref 0–5)
ERYTHROCYTE [DISTWIDTH] IN BLOOD BY AUTOMATED COUNT: 13.5 % (ref 11.6–14.5)
GLOBULIN SER CALC-MCNC: 3.7 G/DL (ref 2–4)
GLUCOSE SERPL-MCNC: 108 MG/DL (ref 74–99)
HCT VFR BLD AUTO: 36.3 % (ref 36–48)
HGB BLD-MCNC: 12 G/DL (ref 13–16)
LYMPHOCYTES # BLD: 1 K/UL (ref 0.9–3.6)
LYMPHOCYTES NFR BLD: 11 % (ref 21–52)
MCH RBC QN AUTO: 28.2 PG (ref 24–34)
MCHC RBC AUTO-ENTMCNC: 33.1 G/DL (ref 31–37)
MCV RBC AUTO: 85.2 FL (ref 74–97)
MONOCYTES # BLD: 0.8 K/UL (ref 0.05–1.2)
MONOCYTES NFR BLD: 9 % (ref 3–10)
NEUTS SEG # BLD: 7.8 K/UL (ref 1.8–8)
NEUTS SEG NFR BLD: 79 % (ref 40–73)
P-R INTERVAL, ECG05: 188 MS
PLATELET # BLD AUTO: 205 K/UL (ref 135–420)
PMV BLD AUTO: 9.8 FL (ref 9.2–11.8)
POTASSIUM SERPL-SCNC: 4 MMOL/L (ref 3.5–5.5)
PROT SERPL-MCNC: 7.4 G/DL (ref 6.4–8.2)
Q-T INTERVAL, ECG07: 410 MS
QRS DURATION, ECG06: 90 MS
QTC CALCULATION (BEZET), ECG08: 463 MS
RBC # BLD AUTO: 4.26 M/UL (ref 4.7–5.5)
SODIUM SERPL-SCNC: 139 MMOL/L (ref 136–145)
TROPONIN I SERPL-MCNC: <0.02 NG/ML (ref 0–0.04)
VENTRICULAR RATE, ECG03: 77 BPM
WBC # BLD AUTO: 9.8 K/UL (ref 4.6–13.2)

## 2020-10-22 PROCEDURE — 74011250637 HC RX REV CODE- 250/637: Performed by: PHYSICIAN ASSISTANT

## 2020-10-22 PROCEDURE — 80053 COMPREHEN METABOLIC PANEL: CPT

## 2020-10-22 PROCEDURE — 85025 COMPLETE CBC W/AUTO DIFF WBC: CPT

## 2020-10-22 PROCEDURE — 74011250636 HC RX REV CODE- 250/636: Performed by: PHYSICIAN ASSISTANT

## 2020-10-22 PROCEDURE — 82550 ASSAY OF CK (CPK): CPT

## 2020-10-22 PROCEDURE — 96374 THER/PROPH/DIAG INJ IV PUSH: CPT

## 2020-10-22 PROCEDURE — 93005 ELECTROCARDIOGRAM TRACING: CPT

## 2020-10-22 PROCEDURE — 71045 X-RAY EXAM CHEST 1 VIEW: CPT

## 2020-10-22 PROCEDURE — 73030 X-RAY EXAM OF SHOULDER: CPT

## 2020-10-22 PROCEDURE — 99285 EMERGENCY DEPT VISIT HI MDM: CPT

## 2020-10-22 RX ORDER — HYDROCODONE BITARTRATE AND ACETAMINOPHEN 5; 325 MG/1; MG/1
1 TABLET ORAL
Status: COMPLETED | OUTPATIENT
Start: 2020-10-22 | End: 2020-10-22

## 2020-10-22 RX ORDER — HYDROCODONE BITARTRATE AND ACETAMINOPHEN 5; 325 MG/1; MG/1
1 TABLET ORAL
Qty: 12 TAB | Refills: 0 | Status: SHIPPED | OUTPATIENT
Start: 2020-10-22 | End: 2020-10-27

## 2020-10-22 RX ORDER — ONDANSETRON 2 MG/ML
4 INJECTION INTRAMUSCULAR; INTRAVENOUS
Status: COMPLETED | OUTPATIENT
Start: 2020-10-22 | End: 2020-10-22

## 2020-10-22 RX ORDER — METHOCARBAMOL 500 MG/1
500 TABLET, FILM COATED ORAL 4 TIMES DAILY
Qty: 20 TAB | Refills: 0 | Status: SHIPPED | OUTPATIENT
Start: 2020-10-22 | End: 2020-10-27

## 2020-10-22 RX ORDER — METHYLPREDNISOLONE 4 MG/1
TABLET ORAL
Qty: 1 DOSE PACK | Refills: 0 | Status: SHIPPED | OUTPATIENT
Start: 2020-10-22 | End: 2022-03-28

## 2020-10-22 RX ADMIN — HYDROCODONE BITARTRATE AND ACETAMINOPHEN 1 TABLET: 5; 325 TABLET ORAL at 11:59

## 2020-10-22 RX ADMIN — ONDANSETRON 4 MG: 2 INJECTION INTRAMUSCULAR; INTRAVENOUS at 11:59

## 2020-10-22 NOTE — ED PROVIDER NOTES
EMERGENCY DEPARTMENT HISTORY AND PHYSICAL EXAM    Date: 10/22/2020  Patient Name: Everett Stock    History of Presenting Illness     Chief Complaint   Patient presents with    Shoulder Pain         History Provided By: Patient    Chief Complaint: left shoulder pain    HPI(Context):   10:28 AM  Everett Stock is a [de-identified] y.o. male with PMHX of CAD with stents x3, HTH, left rotator cuff surgery who presents to the emergency department C/O left shoulder pain. Sharp pain x 2 days. Constant. Worse with movement and better with rest. Pt denies trauma. Reports hx of left shoulder rotator cuff surgery 30 years ago. Pt took tylenol with no relief. Pt reports his wife is having surgery in this hospital so he decided to come to ED for left shoulder xray. Pt denies chest pain, SOB, dizziness, nausea, vomiting, numbness, weakness, LE swelling, and any other sxs or complaints. PCP: Zuleyka Hodge MD    Current Outpatient Medications   Medication Sig Dispense Refill    HYDROcodone-acetaminophen (NORCO) 5-325 mg per tablet Take 1 Tab by mouth every four (4) hours as needed for Pain for up to 5 days. Max Daily Amount: 6 Tabs. 12 Tab 0    methocarbamoL (Robaxin) 500 mg tablet Take 1 Tab by mouth four (4) times daily for 5 days. Indications: muscle spasm 20 Tab 0    methylPREDNISolone (Medrol, Jaspreet,) 4 mg tablet Take with food. 1 Dose Pack 0    furosemide (LASIX) 20 mg tablet Take 20 mg by mouth daily as needed.  tamsulosin (FLOMAX) 0.4 mg capsule Take 2 Caps by mouth daily. 180 Cap 3    finasteride (PROSCAR) 5 mg tablet TAKE 1 TABLET DAILY 90 Tab 3    Toprol XL 25 mg XL tablet       aspirin delayed-release 81 mg tablet Take 1 Tab by mouth.  nitroglycerin (NITROSTAT) 0.4 mg SL tablet by SubLINGual route every five (5) minutes as needed for Chest Pain.  lisinopril (PRINIVIL, ZESTRIL) 10 mg tablet       atorvastatin (LIPITOR) 40 mg tablet   3    pantoprazole (PROTONIX) 40 mg tablet Take 40 mg by mouth daily. Past History     Past Medical History:  Past Medical History:   Diagnosis Date    Bowel obstruction (HCC)     BPH (benign prostatic hyperplasia)     Burning with urination     Chest pain     Colon polyp     Dysuria     Elevated PSA     Enlarged prostate with lower urinary tract symptoms (LUTS)     Essential hypertension     Heart abnormalities     SOB (shortness of breath)     Testicle pain     UTI (urinary tract infection)        Past Surgical History:  Past Surgical History:   Procedure Laterality Date    HX CORONARY STENT PLACEMENT      HX HERNIA REPAIR      HX ORTHOPAEDIC      HX UROLOGICAL  2011    [facility]- R [place], Right Mid, Left [place], Left Mid, Left Base, biopsies all benign Dr. Trace Reece        Family History:  History reviewed. No pertinent family history. Social History:  Social History     Tobacco Use    Smoking status: Former Smoker     Packs/day: 1.00     Years: 30.00     Pack years: 30.00     Types: Cigarettes     Last attempt to quit: 1970     Years since quittin.2    Smokeless tobacco: Never Used   Substance Use Topics    Alcohol use: No    Drug use: No       Allergies: Allergies   Allergen Reactions    Naproxen Other (comments) and Unknown (comments)     Per pt it \"attacks my prostate\"    Nsaids (Non-Steroidal Anti-Inflammatory Drug) Other (comments)     Prostate swelling         Review of Systems   Review of Systems   Respiratory: Negative for shortness of breath. Cardiovascular: Negative for chest pain and leg swelling. Musculoskeletal: Positive for arthralgias. Negative for joint swelling and neck pain. Neurological: Negative for weakness and numbness. All other systems reviewed and are negative.       Physical Exam     Vitals:    10/22/20 1039 10/22/20 1100 10/22/20 1230 10/22/20 1330   BP: (!) 149/61 (!) 121/54 (!) 123/93 (!) 134/55   Pulse: 78 73 73    Resp: 19  21 26   Temp: 98.2 °F (36.8 °C)      SpO2: 100% 97% 97% 98%   Weight: 104.3 kg (230 lb)      Height: 6' (1.829 m)        Physical Exam  Vitals signs and nursing note reviewed. Constitutional:       General: He is not in acute distress. Appearance: Normal appearance. Comments:  geriatric male in NAD. Alert. HENT:      Head: Normocephalic and atraumatic. Right Ear: External ear normal.      Left Ear: External ear normal.      Nose: Nose normal.   Eyes:      Conjunctiva/sclera: Conjunctivae normal.   Neck:      Musculoskeletal: Normal range of motion. Cardiovascular:      Rate and Rhythm: Normal rate and regular rhythm. Pulses: Normal pulses. Radial pulses are 2+ on the right side and 2+ on the left side. Heart sounds: Normal heart sounds. No murmur. No friction rub. No gallop. Pulmonary:      Effort: Pulmonary effort is normal. No respiratory distress. Breath sounds: Normal breath sounds. No stridor. No wheezing or rhonchi. Musculoskeletal:         General: No deformity. Right shoulder: He exhibits decreased range of motion (reduced abduction secondary to pain), tenderness, pain and spasm. Left shoulder: He exhibits decreased range of motion and tenderness. Left upper arm: He exhibits no tenderness, no bony tenderness, no swelling and no edema. Left forearm: He exhibits no tenderness, no bony tenderness, no swelling and no edema. Left hand: He exhibits normal range of motion, no tenderness, normal capillary refill, no deformity and no swelling. Normal sensation noted. Normal strength noted. Skin:     General: Skin is warm. Capillary Refill: Capillary refill takes less than 2 seconds. Neurological:      Mental Status: He is alert and oriented to person, place, and time. Mental status is at baseline.    Psychiatric:         Behavior: Behavior normal.         Judgment: Judgment normal.             Diagnostic Study Results     Labs -     Recent Results (from the past 12 hour(s))   EKG, 12 LEAD, INITIAL Collection Time: 10/22/20 10:33 AM   Result Value Ref Range    Ventricular Rate 77 BPM    Atrial Rate 77 BPM    P-R Interval 188 ms    QRS Duration 90 ms    Q-T Interval 410 ms    QTC Calculation (Bezet) 463 ms    Calculated P Axis 63 degrees    Calculated R Axis 68 degrees    Calculated T Axis 65 degrees    Diagnosis       Normal sinus rhythm  Normal ECG  When compared with ECG of 27-FEB-2018 17:26,  No significant change was found     CBC WITH AUTOMATED DIFF    Collection Time: 10/22/20 12:00 PM   Result Value Ref Range    WBC 9.8 4.6 - 13.2 K/uL    RBC 4.26 (L) 4.70 - 5.50 M/uL    HGB 12.0 (L) 13.0 - 16.0 g/dL    HCT 36.3 36.0 - 48.0 %    MCV 85.2 74.0 - 97.0 FL    MCH 28.2 24.0 - 34.0 PG    MCHC 33.1 31.0 - 37.0 g/dL    RDW 13.5 11.6 - 14.5 %    PLATELET 020 792 - 115 K/uL    MPV 9.8 9.2 - 11.8 FL    NEUTROPHILS 79 (H) 40 - 73 %    LYMPHOCYTES 11 (L) 21 - 52 %    MONOCYTES 9 3 - 10 %    EOSINOPHILS 1 0 - 5 %    BASOPHILS 0 0 - 2 %    ABS. NEUTROPHILS 7.8 1.8 - 8.0 K/UL    ABS. LYMPHOCYTES 1.0 0.9 - 3.6 K/UL    ABS. MONOCYTES 0.8 0.05 - 1.2 K/UL    ABS. EOSINOPHILS 0.1 0.0 - 0.4 K/UL    ABS.  BASOPHILS 0.0 0.0 - 0.1 K/UL    DF AUTOMATED     CARDIAC PANEL,(CK, CKMB & TROPONIN)    Collection Time: 10/22/20 12:00 PM   Result Value Ref Range    CK - MB 1.5 <3.6 ng/ml    CK-MB Index 0.9 0.0 - 4.0 %     39 - 308 U/L    Troponin-I, QT <0.02 0.0 - 3.451 NG/ML   METABOLIC PANEL, COMPREHENSIVE    Collection Time: 10/22/20 12:00 PM   Result Value Ref Range    Sodium 139 136 - 145 mmol/L    Potassium 4.0 3.5 - 5.5 mmol/L    Chloride 107 100 - 111 mmol/L    CO2 31 21 - 32 mmol/L    Anion gap 1 (L) 3.0 - 18 mmol/L    Glucose 108 (H) 74 - 99 mg/dL    BUN 11 7.0 - 18 MG/DL    Creatinine 1.00 0.6 - 1.3 MG/DL    BUN/Creatinine ratio 11 (L) 12 - 20      GFR est AA >60 >60 ml/min/1.73m2    GFR est non-AA >60 >60 ml/min/1.73m2    Calcium 8.5 8.5 - 10.1 MG/DL    Bilirubin, total 0.6 0.2 - 1.0 MG/DL    ALT (SGPT) 18 16 - 61 U/L AST (SGOT) 14 10 - 38 U/L    Alk. phosphatase 75 45 - 117 U/L    Protein, total 7.4 6.4 - 8.2 g/dL    Albumin 3.7 3.4 - 5.0 g/dL    Globulin 3.7 2.0 - 4.0 g/dL    A-G Ratio 1.0 0.8 - 1.7             XR SHOULDER LT AP/LAT MIN 2 V   Final Result   IMPRESSION:      No evidence of acute fracture      Glenohumeral and AC joint arthrosis. Supraspinatus calcific tendinosis. XR CHEST PORT   Final Result   IMPRESSION:      COPD. Bibasilar streaky opacities which may reflect atelectasis versus   infiltrates. CT Results  (Last 48 hours)    None        CXR Results  (Last 48 hours)               10/22/20 1142  XR CHEST PORT Final result    Impression:  IMPRESSION:       COPD. Bibasilar streaky opacities which may reflect atelectasis versus   infiltrates. Narrative:  Hemorrhage is EXAM: XR CHEST PORT       CLINICAL INDICATION/HISTORY: left shoulder pain   -Additional: None       COMPARISON: 5/29/2019       TECHNIQUE: Portable frontal view of the chest       _______________       FINDINGS:       SUPPORT DEVICES: None. HEART AND MEDIASTINUM: Cardiomediastinal silhouette within normal limits. LUNGS AND PLEURAL SPACES: COPD. Bibasilar streaky opacities which may reflect   atelectasis versus infiltrates. No large effusion or pneumothorax.       _______________                 Medications given in the ED-  Medications   HYDROcodone-acetaminophen (NORCO) 5-325 mg per tablet 1 Tab (1 Tab Oral Given 10/22/20 1159)   ondansetron (ZOFRAN) injection 4 mg (4 mg IntraVENous Given 10/22/20 1159)         Medical Decision Making   I am the first provider for this patient. I reviewed the vital signs, available nursing notes, past medical history, past surgical history, family history and social history. Vital Signs-Reviewed the patient's vital signs. Pulse Oximetry Analysis - 97% on RA.  NORMAL     Records Reviewed: Nursing Notes and Old Medical Records    Provider Notes (Medical Decision Making): tendonitis, bursitis, OA, cervical radiculopathy. Suspect MSK but given known CAD with stents x3 will check labs and EKG as constant pain x 1-2 days. Procedures:  Procedures    ED Course:   10:28 AM Initial assessment performed. The patients presenting problems have been discussed, and they are in agreement with the care plan formulated and outlined with them. I have encouraged them to ask questions as they arise throughout their visit. Diagnosis and Disposition       Suspect sxs related to left rotator cuff. LUE NVI. Cardiac workup unremarkable. Will tx with steroid, pain meds, and muscle relaxer. Discussed risks of sedation with medication. Ortho FU. Pt refused sling. Reasons to RTED discussed with pt. All questions answered. Pt feels comfortable going home at this time. Pt expressed understanding and he agrees with plan. Discussed with attending and he agrees with plan. 1. Supraspinatus tendinitis, left        PLAN:  1. D/C Home  2. Current Discharge Medication List      START taking these medications    Details   HYDROcodone-acetaminophen (NORCO) 5-325 mg per tablet Take 1 Tab by mouth every four (4) hours as needed for Pain for up to 5 days. Max Daily Amount: 6 Tabs. Qty: 12 Tab, Refills: 0    Associated Diagnoses: Supraspinatus tendinitis, left      methocarbamoL (Robaxin) 500 mg tablet Take 1 Tab by mouth four (4) times daily for 5 days. Indications: muscle spasm  Qty: 20 Tab, Refills: 0      methylPREDNISolone (Medrol, Jaspreet,) 4 mg tablet Take with food. Qty: 1 Dose Pack, Refills: 0           3.    Follow-up Information     Follow up With Specialties Details Why Zeny Son MD Orthopedic Surgery  please call and make an appointment to see orthopedic surgeon [address] [place] [address] Rd  [address]      Saintair Antonio MD Family Medicine   [place] [address]  [place] [phone]      [facility] EMERGENCY DEPT Emergency Medicine As needed, If symptoms worsen [address] [address]  [phone]        _______________________________    Attestations: This note is prepared by Jackie Thorne PA-C.  _______________________________          Please note that this dictation was completed with [employer], the computer voice recognition software. Quite often unanticipated grammatical, syntax, homophones, and other interpretive errors are inadvertently transcribed by the computer software. Please disregard these errors. Please excuse any errors that have escaped final proofreading.

## 2022-03-14 ENCOUNTER — HOSPITAL ENCOUNTER (OUTPATIENT)
Dept: PREADMISSION TESTING | Age: 82
Discharge: HOME | End: 2022-03-14
Payer: MEDICARE

## 2022-03-14 ENCOUNTER — TRANSCRIBE ORDER (OUTPATIENT)
Dept: REGISTRATION | Age: 82
End: 2022-03-14

## 2022-03-14 DIAGNOSIS — N40.1 ENLARGED PROSTATE WITH URINARY OBSTRUCTION: Primary | ICD-10-CM

## 2022-03-14 DIAGNOSIS — N13.8 ENLARGED PROSTATE WITH URINARY OBSTRUCTION: ICD-10-CM

## 2022-03-14 DIAGNOSIS — N40.1 ENLARGED PROSTATE WITH URINARY OBSTRUCTION: ICD-10-CM

## 2022-03-14 DIAGNOSIS — Z01.812 BLOOD TESTS PRIOR TO TREATMENT OR PROCEDURE: ICD-10-CM

## 2022-03-14 DIAGNOSIS — N13.8 ENLARGED PROSTATE WITH URINARY OBSTRUCTION: Primary | ICD-10-CM

## 2022-03-14 LAB
ANION GAP SERPL CALC-SCNC: 4 MMOL/L (ref 3–18)
BUN SERPL-MCNC: 17 MG/DL (ref 7–18)
BUN/CREAT SERPL: 21 (ref 12–20)
CALCIUM SERPL-MCNC: 8.5 MG/DL (ref 8.5–10.1)
CHLORIDE SERPL-SCNC: 108 MMOL/L (ref 100–111)
CO2 SERPL-SCNC: 28 MMOL/L (ref 21–32)
CREAT SERPL-MCNC: 0.8 MG/DL (ref 0.6–1.3)
ERYTHROCYTE [DISTWIDTH] IN BLOOD BY AUTOMATED COUNT: 13.2 % (ref 11.6–14.5)
GLUCOSE SERPL-MCNC: 96 MG/DL (ref 74–99)
HCT VFR BLD AUTO: 36.9 % (ref 36–48)
HGB BLD-MCNC: 12 G/DL (ref 13–16)
MCH RBC QN AUTO: 28.3 PG (ref 24–34)
MCHC RBC AUTO-ENTMCNC: 32.5 G/DL (ref 31–37)
MCV RBC AUTO: 87 FL (ref 78–100)
NRBC # BLD: 0 K/UL (ref 0–0.01)
NRBC BLD-RTO: 0 PER 100 WBC
PLATELET # BLD AUTO: 201 K/UL (ref 135–420)
PMV BLD AUTO: 9.8 FL (ref 9.2–11.8)
POTASSIUM SERPL-SCNC: 4 MMOL/L (ref 3.5–5.5)
RBC # BLD AUTO: 4.24 M/UL (ref 4.35–5.65)
SODIUM SERPL-SCNC: 140 MMOL/L (ref 136–145)
WBC # BLD AUTO: 7.4 K/UL (ref 4.6–13.2)

## 2022-03-14 PROCEDURE — 80048 BASIC METABOLIC PNL TOTAL CA: CPT

## 2022-03-14 PROCEDURE — 85027 COMPLETE CBC AUTOMATED: CPT

## 2022-03-14 PROCEDURE — 36415 COLL VENOUS BLD VENIPUNCTURE: CPT

## 2022-03-18 PROBLEM — R30.0 DYSURIA: Status: ACTIVE | Noted: 2020-03-30

## 2022-03-18 PROBLEM — K56.609 SBO (SMALL BOWEL OBSTRUCTION) (HCC): Status: ACTIVE | Noted: 2019-05-29

## 2022-03-18 PROBLEM — R39.11 URINARY HESITANCY: Status: ACTIVE | Noted: 2018-05-03

## 2022-03-19 PROBLEM — N50.819 TESTICLE PAIN: Status: ACTIVE | Noted: 2020-04-17

## 2022-03-19 PROBLEM — N45.1 EPIDIDYMITIS, RIGHT: Status: ACTIVE | Noted: 2020-04-17

## 2022-03-28 ENCOUNTER — HOSPITAL ENCOUNTER (OUTPATIENT)
Dept: PREADMISSION TESTING | Age: 82
Discharge: HOME | End: 2022-03-28

## 2022-03-28 VITALS — BODY MASS INDEX: 25.76 KG/M2 | WEIGHT: 184 LBS | HEIGHT: 71 IN

## 2022-03-28 RX ORDER — LEVOFLOXACIN 5 MG/ML
500 INJECTION, SOLUTION INTRAVENOUS ONCE
Status: CANCELLED | OUTPATIENT
Start: 2022-03-28 | End: 2022-03-28

## 2022-03-28 RX ORDER — SODIUM CHLORIDE, SODIUM LACTATE, POTASSIUM CHLORIDE, CALCIUM CHLORIDE 600; 310; 30; 20 MG/100ML; MG/100ML; MG/100ML; MG/100ML
125 INJECTION, SOLUTION INTRAVENOUS CONTINUOUS
Status: CANCELLED | OUTPATIENT
Start: 2022-03-28

## 2022-03-28 NOTE — PERIOP NOTES
PAT phone interview completed with patient and wife. Patient made aware to be NPO. Patient stated he had two doses of COVID vaccine. Patient made aware not to get COVID test. Patient stated he has a ride for discharge and someone to stay with him for 24 hours after procedure. Patient stated he does not have a DNR order.

## 2022-04-06 NOTE — H&P
Urology [address] [place], [place] [address] [address]  Tel: [phone]  Fax: [phone]    Patient : Josiane Carroll   YOB: 1940   Birth Sex: Male      Current Gender: Male      Date:  04/04/2022 8:44 PM    Visit Type:   Pre Op   Assessment/Plan  # Detail Type Description    1. Assessment Enlarged prostate with lower urinary tract symptoms (N40.1). Patient Plan Patient underwent cystoscopy in uroflow study today. Cystoscopy revealed enlarged lateral lobes the prostate no median lobe. Uroflow did reveal evidence of obstruction with a postvoid residual 160 cc and a voided volume of 162 mL. Peak flow was 8 mL/second. We reviewed options and he is going to proceed with GreenLight laser of the prostate. Risks including pain infection bleeding need for a diehl as well as retrograde ejacuation reviewed he understands and will proceed         2. Assessment Dysuria (R30.0). 3. Assessment Suprapubic abdominal pain (R10.2). 4. Assessment Feeling of incomplete bladder emptying (R39.14). Additional Visit Information      This 80year old male presents for BPH, Elevated PSA Uro and Testicular pain. History of Present Illness  1. BPH   Onset was gradual. It occurs daily. The problem is worse. Associated symptoms include suprapubic pain and Urethral pain. Pertinent negatives include chills, constipation and fever. Additional information: Pt w BPH hx he has been treated with Keflex, Linezolid, Bactrim DS, and Levaquin, he is having alot of dysuria. He is on Flomax and Finasteride. 8 mo ago he began having pain. He Has intermittent suprapubic pain when voiding and when he is not voiding., he has intermittent urethral discomfort, This happens all the time. These symptoms appear to be consistent with bladder spasms. Tx w Pyridium as well as Myrbetriq 25 mg., however chronic prostatitis cannot be ruled out.            Comments: 10/26/21  Patient with history of urinary issues prostatitis now also possible epididymitis. He cannot use NSAID since he has had retention x2. Myrbetriq did not help his symptoms. I am going to give him samples of Gemtesa 75 mg.  12/8/21  Pt here for f/u he He only used Gemtesa when he was taking doxycycline so at this point would not sure if his bladder symptoms will improve using Gemtesa he will restart on the medication and hopefully that will help his urinary symptoms. 1/24/22  Patient here today for follow-up he has not had any improvement in his urinary symptoms he continues to have dysuria % of the time. He also has some symptoms when not voiding. I treated him with Gemtesa with no improvement. He has had multiple antibiotics in the past.  He is interested in possible GreenLight laser of the prostate. Since that was recommended previously by Dr. Gustavo Hull. We will schedule for cystoscopy and uroflow study to see if he is a good candidate. I am also going to give him a prescription for Elavil 10mg. 2/23/22  Pt here today for cystoscopy and uroflow study to better evaluate his urinary symptoms. 4/4/2022  Pt evaluation revealed evidence of obstruction, he is scheduled for Greenlight laser of the prostate    2. Elevated PSA Uro   The onset of symptoms was gradual. The problem is currently stable. Reviewed today was a PSA taken on 03/02/2021 with findings of 5.02 ng/mL. Pertinent history includes age over 48. The patient does not report modifying factors such as medication, instrumentation, recent ejaculation or recent 5 JESSICA. Associated symptoms include suprapubic pain. Additional information: Patient with a long history of elevated PSA he remains on finasteride. However given his age no it intervention needed. 3.  Testicular pain   The onset was sudden. The pain is located near the left testicle. Location of the mass was epididymis.  The patient's prior pertinent history does not include BPH, dysuria or history of UTIs. Additional information: Pt w left testicular pain LEFT side for about 4 weeks. It is radiating through the groin left-sided denies any trauma to the area no heavy lifting or strenuous activity. .            Comments: 12/08/2021   Patient given doxycycline at his last visit. this seems to have helped his testicular pain. No further treatment is needed at this time. He also underwent CT scan which did not reveal any significant urologic findings other than a small left-sided bladder diverticulum and moderately enlarged prostate. 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 Do not exceed 3 doses in 15 minutes.  //   Y   pantoprazole 40 mg tablet,delayed release take 1 tablet by oral route  every day //   Y   finasteride 5 mg tablet take 1 tablet by oral route  every day //   Y   lisinopril 20 mg tablet take 1 tablet by oral route  every day //   Y   Pyridium 200 mg tablet take 1 tablet by oral route 3 times every day after meals every 8 hours 09/15/2021   N   tamsulosin 0.4 mg capsule take 1 capsule by oral route  every day 1/2 hour following the same meal each day 01/07/2022 01/07/2022 N   amitriptyline 10 mg tablet take 1 tablet by oral route  [facility] 01/24/2022   N Allergies  Ingredient Reaction (Severity) Medication Name Comment   NSAIDS (NON-STEROIDAL ANTI-INFLAMMATORY DRUG) difficulty urinating (severe)         Review of Systems  System Neg/Pos Details   Constitutional Negative Chills and Fever. ENMT Negative Ear infections and Sore throat. Eyes Negative Blurred vision, Double vision and Eye pain. Respiratory Negative Asthma, Chronic cough, Dyspnea and Wheezing. Cardio Negative Chest pain. GI Negative Constipation, Decreased appetite, Diarrhea, Nausea and Vomiting.  Positive Suprapubic pain.  Negative Dysuria. Endocrine Negative Cold intolerance, Heat intolerance, Increased thirst and Weight loss. Neuro Negative Headache and Tremors. Psych Negative Anxiety and Depression. Integumentary Negative Itching skin and Rash. MS Negative Back pain and Joint pain. Hema/Lymph Negative Easy bleeding. Physical Exam  Exam Findings Details   Constitutional Normal Well developed. Neck Exam Normal Inspection - Normal.   Respiratory Normal Inspection - Normal.   Extremity Normal No edema. Neurological Normal Alert and oriented to person, place and time. Cranial nerves intact. No motor or sensory deficits. Psychiatric Normal Orientation - Oriented to time, place, person & situation. Appropriate mood and affect.      Medications (added, continued, or stopped today)  Start Date Medication Directions PRN Status PRN Reason Instruction Stop Date   01/24/2022 amitriptyline 10 mg tablet take 1 tablet by oral route  qhs N       Aspir-81 mg tablet,delayed release take 1 tablet by oral route  every day N       finasteride 5 mg tablet take 1 tablet by oral route  every day N       lisinopril 20 mg tablet take 1 tablet by oral route  every day N       metoprolol succinate ER 25 mg tablet,extended release 24 hr take 1 tablet by oral route  every day N       nitroglycerin 0.4 mg sublingual tablet place 1 tablet by sublingual route  every 5 minutes as needed for chest pain. Do not exceed 3 doses in 15 minutes. N       pantoprazole 40 mg tablet,delayed release take 1 tablet by oral route  every day N       pravastatin 80 mg tablet take 1 tablet by oral route  every day N      09/15/2021 Pyridium 200 mg tablet take 1 tablet by oral route 3 times every day after meals every 8 hours N      01/07/2022 tamsulosin 0.4 mg capsule take 1 capsule by oral route  every day 1/2 hour following the same meal each day N          Active Patient Care Team Members  Name Contact Agency Type Support Role Relationship Active Date Inactive Date Specialty   Adriano Telles   Patient provider PCP      Juana Ballesteros   Emergency Contact Spouse          Provider:    Adele Massey MD 04/05/2022 6:59 AM     Document generated by:  Gina Ruiz 04/05/2022 06:59 AM      ----------------------------------------------------------------------------------------------------------------------------------------------------------------------      Electronically signed by Adele Massey MD on 04/05/2022 07:56 AM

## 2022-04-07 ENCOUNTER — ANESTHESIA EVENT (OUTPATIENT)
Dept: SURGERY | Age: 82
End: 2022-04-07
Payer: MEDICARE

## 2022-04-07 ENCOUNTER — ANESTHESIA (OUTPATIENT)
Dept: SURGERY | Age: 82
End: 2022-04-07
Payer: MEDICARE

## 2022-04-07 ENCOUNTER — HOSPITAL ENCOUNTER (OUTPATIENT)
Age: 82
Setting detail: OUTPATIENT SURGERY
Discharge: HOME | End: 2022-04-07
Attending: UROLOGY | Admitting: UROLOGY
Payer: MEDICARE

## 2022-04-07 VITALS
HEIGHT: 71 IN | BODY MASS INDEX: 26.32 KG/M2 | OXYGEN SATURATION: 98 % | DIASTOLIC BLOOD PRESSURE: 63 MMHG | RESPIRATION RATE: 14 BRPM | SYSTOLIC BLOOD PRESSURE: 129 MMHG | TEMPERATURE: 98.8 F | WEIGHT: 188 LBS | HEART RATE: 68 BPM

## 2022-04-07 PROCEDURE — 76060000033 HC ANESTHESIA 1 TO 1.5 HR: Performed by: UROLOGY

## 2022-04-07 PROCEDURE — 76010000161 HC OR TIME 1 TO 1.5 HR INTENSV-TIER 1: Performed by: UROLOGY

## 2022-04-07 PROCEDURE — 77030034696 HC CATH URETH FOL 2W BARD -A: Performed by: UROLOGY

## 2022-04-07 PROCEDURE — 77030020782 HC GWN BAIR PAWS FLX 3M -B: Performed by: UROLOGY

## 2022-04-07 PROCEDURE — 77030018836 HC SOL IRR NACL ICUM -A: Performed by: UROLOGY

## 2022-04-07 PROCEDURE — 74011250637 HC RX REV CODE- 250/637: Performed by: UROLOGY

## 2022-04-07 PROCEDURE — 74011000250 HC RX REV CODE- 250: Performed by: NURSE ANESTHETIST, CERTIFIED REGISTERED

## 2022-04-07 PROCEDURE — 76210000063 HC OR PH I REC FIRST 0.5 HR: Performed by: UROLOGY

## 2022-04-07 PROCEDURE — 77030018842 HC SOL IRR SOD CL 9% BAXT -A: Performed by: UROLOGY

## 2022-04-07 PROCEDURE — 77030012508 HC MSK AIRWY LMA AMBU -A: Performed by: ANESTHESIOLOGY

## 2022-04-07 PROCEDURE — 74011250636 HC RX REV CODE- 250/636: Performed by: UROLOGY

## 2022-04-07 PROCEDURE — 74011250636 HC RX REV CODE- 250/636: Performed by: NURSE ANESTHETIST, CERTIFIED REGISTERED

## 2022-04-07 PROCEDURE — 77030018831 HC SOL IRR H20 BAXT -A: Performed by: UROLOGY

## 2022-04-07 PROCEDURE — 2709999900 HC NON-CHARGEABLE SUPPLY: Performed by: UROLOGY

## 2022-04-07 PROCEDURE — 76210000021 HC REC RM PH II 0.5 TO 1 HR: Performed by: UROLOGY

## 2022-04-07 RX ORDER — PROPOFOL 10 MG/ML
INJECTION, EMULSION INTRAVENOUS AS NEEDED
Status: DISCONTINUED | OUTPATIENT
Start: 2022-04-07 | End: 2022-04-07 | Stop reason: HOSPADM

## 2022-04-07 RX ORDER — FENTANYL CITRATE 50 UG/ML
INJECTION, SOLUTION INTRAMUSCULAR; INTRAVENOUS AS NEEDED
Status: DISCONTINUED | OUTPATIENT
Start: 2022-04-07 | End: 2022-04-07 | Stop reason: HOSPADM

## 2022-04-07 RX ORDER — SODIUM CHLORIDE, SODIUM LACTATE, POTASSIUM CHLORIDE, CALCIUM CHLORIDE 600; 310; 30; 20 MG/100ML; MG/100ML; MG/100ML; MG/100ML
100 INJECTION, SOLUTION INTRAVENOUS CONTINUOUS
Status: DISCONTINUED | OUTPATIENT
Start: 2022-04-07 | End: 2022-04-07 | Stop reason: HOSPADM

## 2022-04-07 RX ORDER — ONDANSETRON 2 MG/ML
INJECTION INTRAMUSCULAR; INTRAVENOUS AS NEEDED
Status: DISCONTINUED | OUTPATIENT
Start: 2022-04-07 | End: 2022-04-07 | Stop reason: HOSPADM

## 2022-04-07 RX ORDER — HYDROMORPHONE HYDROCHLORIDE 1 MG/ML
0.5 INJECTION, SOLUTION INTRAMUSCULAR; INTRAVENOUS; SUBCUTANEOUS
Status: DISCONTINUED | OUTPATIENT
Start: 2022-04-07 | End: 2022-04-07 | Stop reason: HOSPADM

## 2022-04-07 RX ORDER — PROCHLORPERAZINE EDISYLATE 5 MG/ML
5 INJECTION INTRAMUSCULAR; INTRAVENOUS ONCE
Status: DISCONTINUED | OUTPATIENT
Start: 2022-04-07 | End: 2022-04-07 | Stop reason: HOSPADM

## 2022-04-07 RX ORDER — FUROSEMIDE 10 MG/ML
INJECTION INTRAMUSCULAR; INTRAVENOUS AS NEEDED
Status: DISCONTINUED | OUTPATIENT
Start: 2022-04-07 | End: 2022-04-07 | Stop reason: HOSPADM

## 2022-04-07 RX ORDER — DIPHENHYDRAMINE HYDROCHLORIDE 50 MG/ML
12.5 INJECTION, SOLUTION INTRAMUSCULAR; INTRAVENOUS
Status: DISCONTINUED | OUTPATIENT
Start: 2022-04-07 | End: 2022-04-07 | Stop reason: HOSPADM

## 2022-04-07 RX ORDER — ATROPA BELLADONNA AND OPIUM 16.2; 3 MG/1; MG/1
SUPPOSITORY RECTAL AS NEEDED
Status: DISCONTINUED | OUTPATIENT
Start: 2022-04-07 | End: 2022-04-07 | Stop reason: HOSPADM

## 2022-04-07 RX ORDER — PHENYLEPHRINE HCL IN 0.9% NACL 1 MG/10 ML
SYRINGE (ML) INTRAVENOUS AS NEEDED
Status: DISCONTINUED | OUTPATIENT
Start: 2022-04-07 | End: 2022-04-07 | Stop reason: HOSPADM

## 2022-04-07 RX ORDER — NALOXONE HYDROCHLORIDE 0.4 MG/ML
0.1 INJECTION, SOLUTION INTRAMUSCULAR; INTRAVENOUS; SUBCUTANEOUS AS NEEDED
Status: DISCONTINUED | OUTPATIENT
Start: 2022-04-07 | End: 2022-04-07 | Stop reason: HOSPADM

## 2022-04-07 RX ORDER — LEVOFLOXACIN 5 MG/ML
500 INJECTION, SOLUTION INTRAVENOUS ONCE
Status: COMPLETED | OUTPATIENT
Start: 2022-04-07 | End: 2022-04-07

## 2022-04-07 RX ORDER — SODIUM CHLORIDE 0.9 % (FLUSH) 0.9 %
5-40 SYRINGE (ML) INJECTION EVERY 8 HOURS
Status: DISCONTINUED | OUTPATIENT
Start: 2022-04-07 | End: 2022-04-07 | Stop reason: HOSPADM

## 2022-04-07 RX ORDER — ALBUTEROL SULFATE 0.83 MG/ML
2.5 SOLUTION RESPIRATORY (INHALATION)
Status: DISCONTINUED | OUTPATIENT
Start: 2022-04-07 | End: 2022-04-07 | Stop reason: HOSPADM

## 2022-04-07 RX ORDER — SODIUM CHLORIDE 0.9 % (FLUSH) 0.9 %
5-40 SYRINGE (ML) INJECTION AS NEEDED
Status: DISCONTINUED | OUTPATIENT
Start: 2022-04-07 | End: 2022-04-07 | Stop reason: HOSPADM

## 2022-04-07 RX ORDER — FENTANYL CITRATE 50 UG/ML
25 INJECTION, SOLUTION INTRAMUSCULAR; INTRAVENOUS AS NEEDED
Status: DISCONTINUED | OUTPATIENT
Start: 2022-04-07 | End: 2022-04-07 | Stop reason: HOSPADM

## 2022-04-07 RX ORDER — LIDOCAINE HYDROCHLORIDE 20 MG/ML
INJECTION, SOLUTION EPIDURAL; INFILTRATION; INTRACAUDAL; PERINEURAL AS NEEDED
Status: DISCONTINUED | OUTPATIENT
Start: 2022-04-07 | End: 2022-04-07 | Stop reason: HOSPADM

## 2022-04-07 RX ORDER — SODIUM CHLORIDE, SODIUM LACTATE, POTASSIUM CHLORIDE, CALCIUM CHLORIDE 600; 310; 30; 20 MG/100ML; MG/100ML; MG/100ML; MG/100ML
125 INJECTION, SOLUTION INTRAVENOUS CONTINUOUS
Status: DISCONTINUED | OUTPATIENT
Start: 2022-04-07 | End: 2022-04-07 | Stop reason: HOSPADM

## 2022-04-07 RX ORDER — SODIUM CHLORIDE, SODIUM LACTATE, POTASSIUM CHLORIDE, CALCIUM CHLORIDE 600; 310; 30; 20 MG/100ML; MG/100ML; MG/100ML; MG/100ML
125 INJECTION, SOLUTION INTRAVENOUS CONTINUOUS
Status: DISCONTINUED | OUTPATIENT
Start: 2022-04-07 | End: 2022-04-07

## 2022-04-07 RX ADMIN — FUROSEMIDE 10 MG: 10 INJECTION, SOLUTION INTRAVENOUS at 11:35

## 2022-04-07 RX ADMIN — ONDANSETRON HYDROCHLORIDE 4 MG: 2 INJECTION INTRAMUSCULAR; INTRAVENOUS at 11:39

## 2022-04-07 RX ADMIN — FLUORESCEIN SODIUM 25 MG: 100 INJECTION INTRAVENOUS at 11:09

## 2022-04-07 RX ADMIN — Medication 100 MCG: at 11:11

## 2022-04-07 RX ADMIN — LIDOCAINE HYDROCHLORIDE 60 MG: 20 INJECTION, SOLUTION INTRAVENOUS at 10:39

## 2022-04-07 RX ADMIN — LEVOFLOXACIN 500 MG: 5 INJECTION, SOLUTION INTRAVENOUS at 10:29

## 2022-04-07 RX ADMIN — SODIUM CHLORIDE, SODIUM LACTATE, POTASSIUM CHLORIDE, AND CALCIUM CHLORIDE: 600; 310; 30; 20 INJECTION, SOLUTION INTRAVENOUS at 11:39

## 2022-04-07 RX ADMIN — FENTANYL CITRATE 50 MCG: 50 INJECTION, SOLUTION INTRAMUSCULAR; INTRAVENOUS at 10:29

## 2022-04-07 RX ADMIN — SODIUM CHLORIDE, SODIUM LACTATE, POTASSIUM CHLORIDE, AND CALCIUM CHLORIDE: 600; 310; 30; 20 INJECTION, SOLUTION INTRAVENOUS at 10:29

## 2022-04-07 RX ADMIN — Medication 100 MCG: at 10:54

## 2022-04-07 RX ADMIN — PROPOFOL 130 MG: 10 INJECTION, EMULSION INTRAVENOUS at 10:39

## 2022-04-07 NOTE — INTERVAL H&P NOTE
Update History & Physical    The Patient's History and Physical of April 4, 2022 was reviewed with the patient and I examined the patient. There was no change. The surgical site was confirmed by the patient and me. Plan:  The risk, benefits, expected outcome, and alternative to the recommended procedure have been discussed with the patient. Patient understands and wants to proceed with the procedure.     Electronically signed by Juan R Valdivia MD on 4/7/2022 at 10:18 AM

## 2022-04-07 NOTE — PERIOP NOTES
MEDICARE WELLNESS VISIT + NOTE    CHIEF COMPLAINT:  Octaviano Croft presents for his Subsequent Annual Medicare Wellness Visit.   His additional complaints or concerns are addressed below.     Patient Care Team:  Agnes Thomson DO as PCP - General (Internal Medicine)  John Pinto MD as Cardiologist (Cardiology)        Patient Active Problem List   Diagnosis   • Chronic ischemic heart disease, unspecified   • Cerebral palsy (CMS/HCC)   • Hyperlipidemia, mixed   • Essential hypertension   • Old myocardial infarction   • Arthrodesis status   • Congenital spondylolisthesis   • Myalgia and myositis, unspecified   • Thoracic myelopathy   • Spondylosis of unspecified site with myelopathy   • Thoracic spinal stenosis   • Coronary artery disease involving native coronary artery of native heart without angina pectoris   • Mild mitral regurgitation   • Spinal stenosis, lumbar region, without neurogenic claudication   • Sensorimotor neuropathy   • Transient alteration of awareness   • Urinary tract infection without hematuria   • Type 2 diabetes mellitus with complication, with long-term current use of insulin (CMS/HCC)   • History of anemia   • Vitamin D deficiency   • Benign prostatic hyperplasia with urinary frequency   • Nuclear sclerotic cataract of both eyes   • Bladder mass   • Obesity (BMI 30-39.9)   • Unsteady gait   • Persistent proteinuria associated with type 2 diabetes mellitus (CMS/HCC)   • Cognitive impairment   • Chronic midline low back pain with bilateral sciatica   • Unequal limb length (acquired), unspecified tibia and fibula   • Tinea unguium   • Peripheral vascular disease (CMS/HCC)   • Diabetic neuropathy (CMS/HCC)   • Contracture, unspecified ankle   • Age-related hypermature cataract of right eye   • Posterior subcapsular polar senile cataract of right eye         Past Medical History:   Diagnosis Date   • Acute gastritis without mention of hemorrhage 5/21/2004   • Acute renal failure  Report [facility] to [facility] (CMS/[facility]) 4/6/2018   • Arthritis    • BPH (benign prostatic hyperplasia)    • Calculus of ureter 2/20/10    4 mm Rt distal  (RUVJ)   • Chronic pain     back   • Coronary atherosclerosis of native coronary artery    • Depression    • Esophagitis 2003    ulcerative esophagus=taking prevacid   • Essential hypertension    • Gastroesophageal reflux disease    • Hyperlipidemia    • Infantile cerebral palsy, unspecified     mild- limp and balance issues at times   • Nuclear sclerotic cataract of both eyes 12/12/2017   • Old myocardial infarction 9/26/99    MI, ischemia LAD stent. 55% ejection fraction 2004   • CHAR (obstructive sleep apnea)     no c pap   • PMH of     inflamed uvula x3   • PMH of 1980's    hx depression    • Pneumonia    • Sinusitis, chronic    • Type II or unspecified type diabetes mellitus without mention of complication, uncontrolled 2000   • Urinary incontinence    • Urinary tract infection          Past Surgical History:   Procedure Laterality Date   • Colonoscopy w biopsy  11/06/2006    Dr. Tomas/normal bx 10 year repeat   • Coronary art dil,one vessel  9/26/99    Dr. Pinto @ [facility]--ACS Multi-Link Rx Duet 3.6 x 28 mm--LAD   • Epidural  2/16   • Hb lumbar decompression  01/2015    [facility]   • Laminec/facetect/foramin,lumbar  7/14/14    T9, T10, AND T11 DECOMPRESSIVE LAMINECTOMIES, T9-L2 POSTERIOR INSTRUMENTED FUSION, USE OF ALLOGRAFT BONE/SL/DrOrton/7/14/14   • Laminectomy,facetectomy,lumbar  5/24/08    laminectomy w/rt total facetectomy L1-2 Dr Ross   • Laser vaporization  04/30/2019    Dr. Harris.  Possible bladder mass.   • Lumbar fusion  6/15   • Ptca N/A 1999    1 stent Dr. John Pinto [facility]   • Ptca N/A 2000    Dr. John Pinto 1 stent   • Ptca N/A 2002   • Removal gallbladder     • Service to gastroenterology     • Transcath stent each addn vessl,perc  11/2/00    Dr. Pinto @ [facility]--ACS Multi-link Tx Tristar 3.5 x 18 mm LAD          Social History     Tobacco Use   • Smoking status: Former Smoker     Packs/day: 0.50     Types: Cigarettes     Quit date: 1999     Years since quittin.1   • Smokeless tobacco: Never Used   • Tobacco comment: stoped after MI   Vaping Use   • Vaping Use: never used   Substance Use Topics   • Alcohol use: Not Currently     Alcohol/week: 0.0 standard drinks     Comment: maybe 3 drinks per year   • Drug use: No     Family History   Problem Relation Age of Onset   • Diabetes Mother         hypertension   • Hypertension Mother    • Cancer Father         pvd, MI, esophageal cancer   • Heart disease Father    • Hypertension Father    • Diabetes Maternal Uncle    • Diabetes Sister    • Hypertension Sister    • Cancer Maternal Uncle         colon   • Asthma Son    • NEGATIVE FAMILY HX OF Other                  Current Outpatient Medications   Medication Sig Dispense Refill   • fluticasone (Flonase) 50 MCG/ACT nasal spray Spray 1 spray in each nostril 2 times daily as needed (sinus pressure/congestion). 16 g 11   • insulin regular 70-30 (NovoLIN 70/30 ReliOn) (70-30) 100 UNIT/ML injectable suspension Inject 56 units with 3 meals daily plus correction scale.  Max daily dose 219 200 mL 1   • meloxicam (MOBIC) 15 MG tablet Take 1 tablet by mouth daily. (Patient taking differently: Take 15 mg by mouth daily. States prn) 15 tablet 0   • gabapentin (NEURONTIN) 600 MG tablet Take 1.5 tablets by mouth 2 times daily. Please fill ASAP. 180 tablet 1   • metformin (GLUCOPHAGE) 1000 MG tablet Take 1 tablet by mouth 2 times daily (with meals). 180 tablet 1   • metoPROLOL tartrate (LOPRESSOR) 25 MG tablet Take 1 tablet by mouth every 12 hours. 180 tablet 1   • potassium chloride (KLOR-CON M) 10 MEQ tess ER tablet Take 1 tablet by mouth daily. 90 tablet 1   • lisinopril (ZESTRIL) 10 MG tablet Take 1 tablet by mouth daily. 90 tablet 1   • atorvastatin (LIPITOR) 40 MG tablet Take 1 tablet by mouth daily. 90 tablet 1   • ALPRAZolam  (XANAX) 0.25 MG tablet Take 1 tablet by mouth daily as needed for Anxiety. 30 tablet 0   • tamsulosin (Flomax) 0.4 MG Cap Take 2 capsules by mouth daily after a meal. 180 capsule 2   • furosemide (LASIX) 20 MG tablet Take 2 tablets by mouth daily. 180 tablet 1   • glipiZIDE (GLUCOTROL) 10 MG tablet Take 1 tablet by mouth 2 times daily (before meals). 180 tablet 0   • exenatide ER (Bydureon BCise) 2 MG/0.85ML auto injector Inject 0.85 mLs into the skin every 7 days. (Patient taking differently: Inject 2 mg into the skin every 7 days. Takes on sundays) 3.4 mL 5   • Skin Protectants, Misc. (PeriGuard) Ointment Indications: Skin rash related to diabetes. Apply thin layer three times daily to Rash. (Patient taking differently: as needed. Indications: Skin rash related to diabetes. Apply thin layer three times daily to Rash.) 277 g 1   • finasteride (PROSCAR) 5 MG tablet Take 1 tablet by mouth daily. 90 tablet 2   • cyclobenzaprine (FLEXERIL) 10 MG tablet Take 1 tablet by mouth three times daily as needed for muscle spasms. 90 tablet 0   • nitroGLYcerin (NITROSTAT) 0.4 MG sublingual tablet Place 1 tablet under the tongue every 5 minutes as needed for Chest pain. 90 tablet 1   • sildenafil (REVATIO) 20 MG tablet Take 2-5 tablets by mouth as needed for erectile dysfunction. 50 tablet 5   • IBUPROFEN PO Take by mouth as needed.     • Menthol-Methyl Salicylate (MUSCLE RUB EX) as needed.      • Cinnamon 500 MG Cap Take 1 capsule by mouth as needed.     • meclizine HCl (ANTIVERT) 25 MG tablet Take 25 mg by mouth as needed.     • aspirin 81 MG tablet Take 81 mg by mouth daily.     • Cholecalciferol (VITAMIN D3) 5000 units Tab Take 125 mcg by mouth daily as needed.      • naloxone (NARCAN) 4 MG/0.1ML nasal spray Call 911. [place] the content of 1 device into 1 nostril. May repeat with 2nd device in alternate nostril if no response in 2-3 minutes. 2 each 0   • omeprazole (PRILOSEC) 40 MG capsule Take 1 capsule by mouth daily.  (Patient taking differently: Take 40 mg by mouth daily as needed. ) 90 capsule 1   • Omega-3 Fatty Acids (FISH OIL) 1000 MG capsule Take 1 g by mouth 3 times daily. (Patient taking differently: Take 3 g by mouth daily as needed. ) 90 capsule 2   • Multiple Vitamin tablet Take 1 tablet by mouth daily.     • B Complex Vitamins (VITAMIN-B COMPLEX) TABS Take 1 tablet by mouth 2 times daily.     • Insulin Syringe-Needle U-100 (ReliOn Insulin Syringe) 31G X 5/16\" 0.5 ML Misc USE 1  SYRINGE THREE TIMES DAILY WITH INSULIN. PLEASE SCHEDULE FOLLOW UP VISIT WITH PRESCRIBING PROVIDER FOR ADDITIONAL REFILLS. 300 each 0   • blood glucose (OneTouch Verio) test strip 3 times daily. 300 each 3   • Blood Glucose Monitoring Suppl (ONETOUCH VERIO) w/Device Kit 1 Device 4 times daily (with meals and nightly). 1 kit 1   • ondansetron (ZOFRAN ODT) 4 MG disintegrating tablet Take 4 mg by mouth 3 times daily as needed.     • B-D U/F PEN NEEDLE 5/16\" 31G X 8 MM Misc USE AS DIRECTED 100 each 1     No current facility-administered medications for this visit.        The following items on the Medicare Health Risk Assessment were found to be positive  1.) Do you have an Advance directive, living will, or power of  for health care document that contains your wishes for end of life care?: I don't know     3.) During the past 4 weeks, how would you rate your health?: Fair     4.) During the past 4 weeks, what was the hardest physical activity you could do for at least 2 minutes?: Very Light     5.) Do you do moderate to strenuous exercise (brisk walk) for about 20 minutes for 3 or more days per week?: No, I usually do not exercise this much     6 c.) How many servings of Fried or High Fat Foods do you have each day (1 serving = 1 Joyce, French Fries, chips, doughnut, fried chicken/fish): 2 per day     6 d.) How many servings of Sugar Sweetened Beverages do you have each day ( 1 serving = 1 can or 12 oz cup of sode or juice): 2 per day      7a.) Have you had a fall in the past year?: Yes     7b.) Do you feel unsteady when standing or walking?: Yes     7c.) Do you worry about falling?: Yes     8.) During the past 4 weeks, has your physical and emotional health limited your social activities with family, friends, neighbors, or other groups?: Quite a bit     10.) How often do you have trouble taking medicines the way you have been told to take them?: Sometimes I take my prescribed medications     11a.) Bladder Control problems (urine leaking): Always     11b.) Bowel control problems: Always     11d.) Bodily pain: Often     11j.) Driven/Ridden in a car without wearing your seatbelt: Sometimes     11l.) Sexual Problems: Always     12.) Do you need help with any of the following activities?   (check all that apply): Bathing, Dressing and grooming, Using the Toilet, Getting in and out of bed or chairs     13.) Do you need help with any of the following activities?: Get to places outside of walking distance (can't drive alone, or take a bus/taxi alone), Go shopping for groceries or clothes, Do your housework or laundry, Prepare a meal     15.) How confident are you that you can control and manage most of your health problems?: Somewhat confident         Vision and Hearing screens: No exam data present    Advance Directive:   The patient has the following documents:  No Advance Directives on file. Patient offered documents.    Cognitive/Functional Status: no evidence of cognitive dysfunction by direct observation    Opioid Review: Octaviano is not taking opioid medications.    Recent PHQ 2/9 Score:    PHQ 2:  Date Adult PHQ 2 Score Adult PHQ 2 Interpretation   10/27/2021 1 No further screening needed       PHQ 9:       DEPRESSION ASSESSMENT/PLAN:  Depression screening is negative no further plan needed.     Body mass index is 41.35 kg/m².    BMI ASSESSMENT/PLAN:  Patient is obese.    Low carbohydrate diet        See Patient Instructions section.   Return in  about 1 month (around 11/27/2021) for preoperative evaluation.      OUTPATIENT PROGRESS NOTE    Subjective   Chief Complaint Medicare Wellness Visit (Subsquent )    HPI  He takes alprazolam as needed, approximately 3 times per year.    • Chronic ischemic heart disease, unspecified. Last TTE performed April 2018 revealing mildly increased LV wall thickness with normal EF at 62%, no RV dysfunction. There are NO notes from Dr. Pinto on chart review. The last time he was seen by cardiology within our system was Dr. Hill in 2014.   • Obesity, Class III, BMI 40-49.9 (morbid obesity) (CMS/[facility])   • Cerebral palsy (CMS/[facility]). He was born with it. It has caused him to have an unsteady gait his whole life (required braces for a time). He ambulates at home with a wheeled walker but also uses a wheelchair. Previously requested a lightweight wheelchair for ease of use at home. Paperwork has been completed and sent with no response just yet.   • Hyperlipidemia, mixed. Controlled with atorvastatin 40 mg daily. LDL is 78 mg/dL with an HDL of 31 mg/dL. Triglycerides are very high in the setting of diabetes.   • Acute gastritis without mention of hemorrhage. Characterized by epigastric discomfort that comes and goes. Denies having ever required upper endoscopy.   • Essential hypertension. Controlled with generic furosemide 20 mg daily, metoprolol 25 mg twice a day, and lisinopril 20 mg daily in the past. Endocrinology changed his lisinopril to 10 mg daily for some low blood pressures. His home readings are usually running around 120/70.   • Type II or unspecified type diabetes mellitus without mention of complication, uncontrolled. He was referred to Dr. León in May of 2018. She added Trulicity 0.5 mg weekly on Mondays and generic Actos (pioglitazone) 15 mg daily. His Levemir insulin 50 units daily at bedtime and Humalog rapid acting insulin 18 units 3 times a day before meals was changed to human 70/30 insulin for the cost  carlos Octaviano was only taking it b.i.d. Mr. Alfaro ramped it up to 50 units 3 times a day. He remains on metformin 1000 mg twice a day. He had to stop Trulicity as it would have cost him $900 dollars a month. He has been dieting to lose weight. No retinopathy per dilated eye exam performed in May of this year. Insulin 70/30 increased previously followed by addition of dulaglutide which was subsequently switched to exenatide. A1c is 10.7% with a fasting blood sugar of 241 mg/dL. Mild microalbuminuria.   • Arthrodesis status   • Congenital spondylolisthesis. He has had 4 failed back surgeries and has chronic back pain, no longer on controlled release morphine. Tramadol doesn't do a \"darn thing.\" Gabapentin has helped with paresthesias in the legs. It was increased from 600 mg to 900 mg BID office visit in August. He has not noted improvement in his back pain. Does apply OTC Theraworx prn which also seems to help.   • Thoracic myelopathy   • Thoracic spinal stenosis   • Coronary artery disease involving native coronary artery of native heart without angina pectoris. S/p MI 1999. History of PTCA with stent in 2000 and 2002 at [facility]. Followed by Dr. Pinto, ?as needed.   • Mild mitral regurgitation   • Spinal stenosis, lumbar region, without neurogenic claudication   • Benign prostatic hyperplasia with urinary frequency. Improved since finasteride and tamsulosin were added. No longer follows with Dr. Harris.   * Bilateral cataracts. Upcoming surgery in December.   * Peripheral vascular disease. Involving the lower extremities per CT angiogram performed in 2017. No intervention has occurred as a result of this finding.   * Bladder mass. Underwent resection June 2019, pathology finding partially necrotic fibromuscular tissue with acute inflammation. Recommendations for repeat evaluation with cystoscopy have been declined by the patient given his self-reported plan to be seen at the [facility]. He is not  interested in following up with Dr. Harris at this time.       Medications  Medications were reviewed and updated today.    Histories  I have personally reviewed and updated the patient's past medical, past surgical, family and social histories during today's visit.    REVIEW OF SYSTEMS:    Negative except as mentioned above.      Objective   Visit Vitals  /70 (BP Location: LUE - Left upper extremity, Patient Position: Sitting, Cuff Size: Large Adult)   Pulse 88   Resp 16   Ht 5' 7\" (1.702 m)   Wt 119.7 kg (264 lb)   BMI 41.35 kg/m²       Wt Readings from Last 4 Encounters:   10/27/21 119.7 kg (264 lb)   10/01/21 117.5 kg (259 lb)   09/27/21 113.4 kg (250 lb)   04/15/21 116.8 kg (257 lb 6.4 oz)       Constitutional: Chronically ill-appearing male, in NAD  HEENT: Normocephalic, atraumatic, EOMI, no scleral icterus  Heart: Regular rate  Lungs: Non-labored respirations, symmetrical chest expansion  Neurologic:  A&Ox4, fluent speech, face symmetric  Skin: Warm and dry      Laboratory  I have reviewed the pertinent laboratory tests.    Imaging  No pertinent imaging studies for review at this time.    Assessment & Plan   Diagnoses and associated orders for this visit:  1. Medicare annual wellness visit, subsequent  2. Need for influenza vaccination  -     INFLUENZA QUADRIVALENT HIGH DOSE PRES FREE 0.7 ML VACC,IM  3. Need for COVID-19 vaccine  -     COVID 19 PFIZER-[employer]  4. Rash  -     PeriGuard  5. Chronic ischemic heart disease, unspecified  6. Coronary artery disease involving native coronary artery of native heart without angina pectoris  7. Essential hypertension  8. Hyperlipidemia, mixed  9. Peripheral vascular disease (CMS/HCC)  10. Type 2 diabetes mellitus with microalbuminuria, without long-term current use of insulin (CMS/HCC)  11. Diabetic polyneuropathy associated with type 2 diabetes mellitus (CMS/HCC)  12. Benign prostatic hyperplasia with urinary frequency  13. Other cerebral palsy  (CMS/[facility])      #Chronic ischemic heart disease, CAD:  There is nothing in the records suggest that this patient is still being followed by Dr. Pinto.  Will consider a repeat TTE at his next follow-up visit with a referral to Cardiology either here or with Dr. Pinto.  There is no evidence of decompensated heart failure on examination at this time.  He remains on aspirin 81 mg daily, furosemide 40 mg daily, and lisinopril 10 mg daily.  He is also on 40 mg of atorvastatin daily.  Diabetes poorly controlled, followed by Endocrinology.    #Peripheral vascular disease:  Identified incidentally on prior CT angiogram.  Pain complaints are confounded by chronic back pain.  Consider re-referral to Cardiology in accordance with above.    #BPH:  Controlled on finasteride and tamsulosin per patient.  Will provide further refills of this medication when needed in the future.    All medical conditions were addressed and treatment options are listed as above in the patients active problem list.    Agnes Thomson D.O.  Internal Medicine

## 2022-04-07 NOTE — DISCHARGE INSTRUCTIONS
Julia Herrera. Sangeetha Flynn M.D. [facility] FACILITY  [address], [address] [address], [address]  Office: [phone]  Fax:    [phone]: Procedure(s):  [address] [facility] IMMEDIATELY if any of the following occur:     You are unable to urinate. Urgency to urinate is not uncommon.  You find yourself urinating small frequent amounts associated with severe lower abdominal discomfort.  Bright red blood with clots in the urine. Some reddish urine is not uncommon and should be treated with increasing the amount of fluids you drink.  Temperature above 101.5° and / or chills.  You are nauseous and / or vomiting and you cannot hold down any fluids.  Your pain is not controlled with the pain medication prescribed. Special Considerations:      Do not drive for at least 24 hours after the procedure and until you are no longer taking narcotic pain medication and you are able to move and react without hesitation. MEDICATIONS:  Pain   []  Norco®   []  Percocet® []  Dilaudid®    [x]  Tramadol   Antibiotics   []  Cipro   [x]  Keflex    [] Levaquin   []  Bactrim DS®       Urination   []  Vesicare®   []  Flomax     Burning   []  Pyridium®   []  UribelTM     Nausea   []  Zofran®   []  Phenergan®     Miscellaneous   []           [] Prescriptions Written on Chart    [x] Prescriptions sent Electronically      Pt want to remove diehl himself tomorrow, please give 10cc syringe and show him how to deflate balloon and remove diehl           Our office will call you tomorrow to schedule your first follow-up appointment. Please contact [facility] at [phone] or go to the nearest Emergency Department / Urgent Care facility for any other medical questions or concerns.       DISCHARGE SUMMARY from Nurse    PATIENT INSTRUCTIONS:    After general anesthesia or intravenous sedation, for 24 hours or while taking prescription Narcotics:  · Limit your activities  · Do not drive and operate hazardous machinery  · Do not make important personal or business decisions  · Do  not drink alcoholic beverages  · If you have not urinated within 8 hours after discharge, please contact your surgeon on call. Report the following to your surgeon:  · Excessive pain, swelling, redness or odor of or around the surgical area  · Temperature over 100.5  · Nausea and vomiting lasting longer than 4 hours or if unable to take medications  · Any signs of decreased circulation or nerve impairment to extremity: change in color, persistent  numbness, tingling, coldness or increase pain  · Any questions    What to do at Home:  Recommended activity: Activity as tolerated and no driving for today,     If you experience any of the following symptoms as noted above, please follow up with Dr. Ryan Trevino. *  Please give a list of your current medications to your Primary Care Provider. *  Please update this list whenever your medications are discontinued, doses are      changed, or new medications (including over-the-counter products) are added. *  Please carry medication information at all times in case of emergency situations. These are general instructions for a healthy lifestyle:    No smoking/ No tobacco products/ Avoid exposure to second hand smoke  Surgeon General's Warning:  Quitting smoking now greatly reduces serious risk to your health. Obesity, smoking, and sedentary lifestyle greatly increases your risk for illness    A healthy diet, regular physical exercise & weight monitoring are important for maintaining a healthy lifestyle    You may be retaining fluid if you have a history of heart failure or if you experience any of the following symptoms:  Weight gain of 3 pounds or more overnight or 5 pounds in a week, increased swelling in our hands or feet or shortness of breath while lying flat in bed.   Please call your doctor as soon as you notice any of these symptoms; do not wait until your next office visit. The discharge information has been reviewed with the patient and caregiver. The patient and caregiver verbalized understanding. Discharge medications reviewed with the patient and caregiver and appropriate educational materials and side effects teaching were provided.   ___________________________________________________________________________________________________________________________________

## 2022-04-07 NOTE — BRIEF OP NOTE
Brief Postoperative Note    Patient: Swapnil Castro  YOB: 1940  MRN: [id number]    Date of Procedure: 4/7/2022     Pre-Op Diagnosis: BPH WITH OBSTRUCTION    Post-Op Diagnosis: Same as preoperative diagnosis.       Procedure(s):  GREENLIGHT LASER OF PROSTATE 259, 715j    Surgeon(s):  Moses Marinelli MD    Surgical Assistant: None    Anesthesia: General     Estimated Blood Loss (mL): Minimal    Complications: None    Specimens: * No specimens in log *     Implants: * No implants in log *    Drains: * No LDAs found *    Findings: enlarged lateral lobes prostate    Electronically Signed by Alpa Tapia MD on 4/7/2022 at 11:47 AM

## 2022-04-07 NOTE — PERIOP NOTES
Paged Dr. Cookie Bermudez for patient sign out. Patient meets criteria for transfer to the next phase of care.

## 2022-04-07 NOTE — OP NOTES
Postoperative Note    Patient: Phyllis Sandoval  YOB: 1940  MRN: [id number]    Date of Procedure: 4/7/2022     Pre-Op Diagnosis: BPH WITH OBSTRUCTION    Post-Op Diagnosis: Same as preoperative diagnosis. Procedure(s):  GREENLIGHT LASER OF PROSTATE 259, 715j    Surgeon(s):  Mariama Gilbert MD    Surgical Assistant: None    Anesthesia: General     Estimated Blood Loss (mL): Minimal    Complications: None    Specimens: * No specimens in log *     Implants: * No implants in log *    Drains: * No LDAs found *    Findings: enlarged lateral lobes prostate      Procedure Details:    After informed consent was obtained, the patient was taken to the operating room, and he underwent laryngeal mask anesthesia in the supine position. He was then prepped and draped in the usual surgical fashion after being placed in the dorsal lithotomy position. A 23-Malian laser cystoscope was inserted with visual obturator per urethra into the bladder. The ureteral orifices were identified. The verumontanum was identified. Next, the visual obturator was exchanged for a laser guide. The greenlight laser fiber was inserted through the laser guide. Lasering was begun at the bladder neck from 5 o'clock to the 7 o'clock position. Once the bladder neck was taken down, my attention then turned towards the right lobe from the bladder neck towards the verumontanum. The left lobe was taken down in a similar fashion. The remainder of the excess prostatic adenoma was removed with the laser. The bladder was redrained, refilled at low-volume. There was no active bleeding. 10 ml of Lasix was given IV to promote post op diuresis. The procedure ended at this point. [id number] joules were used. Both ureteral orifices and the verumontanum were intact at the end of the procedure. The bladder was filled. The laser was placed on standby and the cystoscope was removed.  A 22-Malian, 5 mL two-way [place] catheter was inserted per urethra into the bladder draining clear urine. Ten mL of sterile water was placed in the balloon and the catheter was connected to a leg bag. The patient was then washed off, dried, and placed in the supine position. He was awakened from anesthesia and then transferred to the post anaesthesia care unit.            Yvrose Meehan MD  4/7/2022  11:51 AM

## 2022-04-07 NOTE — PERIOP NOTES
Reviewed PTA medication list with patient/caregiver and patient/caregiver denies any additional medications. Patient admits to having a responsible adult care for them at home for at least 24 hours after surgery. Patient encouraged to use gown warming system and informed that using said warming gown to regulate body temperature prior to a procedure has been shown to help reduce the risks of blood clots and infection. Patient's pharmacy of choice verified and documented in PTA medication section. Dual skin assessment & fall risk band verification completed with Juanita Eckert RN.

## 2022-04-07 NOTE — ANESTHESIA PREPROCEDURE EVALUATION
Relevant Problems   CARDIOVASCULAR   (+) Coronary artery disease   (+) Hypertension       Anesthetic History   No history of anesthetic complications            Review of Systems / Medical History  Patient summary reviewed, nursing notes reviewed and pertinent labs reviewed    Pulmonary  Within defined limits            Pertinent negatives: No sleep apnea and smoker     Neuro/Psych   Within defined limits           Cardiovascular    Hypertension: well controlled          CAD and cardiac stents    Exercise tolerance: >4 METS  Comments: 3 stents, 10 plus years ago   GI/Hepatic/Renal     GERD: well controlled        Pertinent negatives: No hiatal hernia   Endo/Other        Arthritis     Other Findings              Physical Exam    Airway  Mallampati: II  TM Distance: > 6 cm  Neck ROM: normal range of motion   Mouth opening: Normal     Cardiovascular    Rhythm: regular  Rate: normal         Dental    Dentition: Full lower dentures, Full upper dentures and Implants     Pulmonary  Breath sounds clear to auscultation               Abdominal  GI exam deferred       Other Findings            Anesthetic Plan    ASA: 3  Anesthesia type: general          Induction: Intravenous  Anesthetic plan and risks discussed with: Patient

## 2022-04-07 NOTE — ANESTHESIA POSTPROCEDURE EVALUATION
Procedure(s):  GREENLIGHT LASER OF PROSTATE.    general    Anesthesia Post Evaluation      Multimodal analgesia: multimodal analgesia used between 6 hours prior to anesthesia start to PACU discharge  Patient location during evaluation: PACU  Patient participation: complete - patient participated  Level of consciousness: awake and alert  Pain score: 0  Airway patency: patent  Anesthetic complications: no  Cardiovascular status: acceptable  Respiratory status: acceptable  Post anesthesia nausea and vomiting:  none  Final Post Anesthesia Temperature Assessment:  Normothermia (36.0-37.5 degrees C)      INITIAL Post-op Vital signs:   Vitals Value Taken Time   /59 04/07/22 1210   Temp 37.1 °C (98.8 °F) 04/07/22 1153   Pulse 76 04/07/22 1213   Resp 22 04/07/22 1213   SpO2 95 % 04/07/22 1213   Vitals shown include unvalidated device data.

## 2025-06-30 ENCOUNTER — APPOINTMENT (OUTPATIENT)
Facility: HOSPITAL | Age: 85
End: 2025-06-30
Payer: MEDICARE

## 2025-06-30 ENCOUNTER — HOSPITAL ENCOUNTER (EMERGENCY)
Facility: HOSPITAL | Age: 85
Discharge: HOME | End: 2025-06-30
Attending: EMERGENCY MEDICINE
Payer: MEDICARE

## 2025-06-30 VITALS
HEART RATE: 91 BPM | WEIGHT: 175 LBS | TEMPERATURE: 98.2 F | HEIGHT: 70 IN | DIASTOLIC BLOOD PRESSURE: 86 MMHG | BODY MASS INDEX: 25.05 KG/M2 | RESPIRATION RATE: 20 BRPM | SYSTOLIC BLOOD PRESSURE: 137 MMHG | OXYGEN SATURATION: 96 %
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
## 2025-06-30 DIAGNOSIS — S20.219A CONTUSION OF CHEST WALL, UNSPECIFIED LATERALITY, INITIAL ENCOUNTER: ICD-10-CM

## 2025-06-30 DIAGNOSIS — E87.1 DEHYDRATION WITH HYPONATREMIA: ICD-10-CM

## 2025-06-30 DIAGNOSIS — S30.1XXA CONTUSION OF ABDOMINAL WALL, INITIAL ENCOUNTER: ICD-10-CM

## 2025-06-30 DIAGNOSIS — E86.0 DEHYDRATION WITH HYPONATREMIA: ICD-10-CM

## 2025-06-30 LAB
ANION GAP SERPL CALC-SCNC: 11 MMOL/L (ref 3–18)
BASOPHILS # BLD: 0.02 K/UL (ref 0–0.1)
BASOPHILS NFR BLD: 0.2 % (ref 0–2)
BUN SERPL-MCNC: 17 MG/DL (ref 6–23)
BUN/CREAT SERPL: 26 (ref 12–20)
CALCIUM SERPL-MCNC: 8.6 MG/DL (ref 8.5–10.1)
CHLORIDE SERPL-SCNC: 96 MMOL/L (ref 98–107)
CO2 SERPL-SCNC: 24 MMOL/L (ref 21–32)
CREAT SERPL-MCNC: 0.63 MG/DL (ref 0.6–1.3)
DIFFERENTIAL METHOD BLD: ABNORMAL
EOSINOPHIL # BLD: 0.03 K/UL (ref 0–0.4)
EOSINOPHIL NFR BLD: 0.3 % (ref 0–5)
ERYTHROCYTE [DISTWIDTH] IN BLOOD BY AUTOMATED COUNT: 13.7 % (ref 11.6–14.5)
GLUCOSE SERPL-MCNC: 117 MG/DL (ref 74–108)
HCT VFR BLD AUTO: 34.8 % (ref 36–48)
HGB BLD-MCNC: 11.8 G/DL (ref 13–16)
IMM GRANULOCYTES # BLD AUTO: 0.08 K/UL (ref 0–0.04)
IMM GRANULOCYTES NFR BLD AUTO: 0.8 % (ref 0–0.5)
LYMPHOCYTES # BLD: 0.38 K/UL (ref 0.9–3.3)
LYMPHOCYTES NFR BLD: 3.6 % (ref 21–52)
MCH RBC QN AUTO: 27.6 PG (ref 24–34)
MCHC RBC AUTO-ENTMCNC: 33.9 G/DL (ref 31–37)
MCV RBC AUTO: 81.3 FL (ref 78–100)
MONOCYTES # BLD: 0.52 K/UL (ref 0.05–1.2)
MONOCYTES NFR BLD: 5 % (ref 3–10)
NEUTS SEG # BLD: 9.45 K/UL (ref 1.8–8)
NEUTS SEG NFR BLD: 90.1 % (ref 40–73)
NRBC # BLD: 0 K/UL (ref 0–0.01)
NRBC BLD-RTO: 0 PER 100 WBC
PLATELET # BLD AUTO: 162 K/UL (ref 135–420)
PMV BLD AUTO: 9.5 FL (ref 9.2–11.8)
POTASSIUM SERPL-SCNC: 4.3 MMOL/L (ref 3.5–5.5)
RBC # BLD AUTO: 4.28 M/UL (ref 4.35–5.65)
SODIUM SERPL-SCNC: 130 MMOL/L (ref 136–145)
WBC # BLD AUTO: 10.5 K/UL (ref 4.6–13.2)

## 2025-06-30 PROCEDURE — 12001 RPR S/N/AX/GEN/TRNK 2.5CM/<: CPT

## 2025-06-30 PROCEDURE — 6370000000 HC RX 637 (ALT 250 FOR IP): Performed by: EMERGENCY MEDICINE

## 2025-06-30 PROCEDURE — 85025 COMPLETE CBC W/AUTO DIFF WBC: CPT

## 2025-06-30 PROCEDURE — 2580000003 HC RX 258: Performed by: EMERGENCY MEDICINE

## 2025-06-30 PROCEDURE — 80048 BASIC METABOLIC PNL TOTAL CA: CPT

## 2025-06-30 PROCEDURE — 96360 HYDRATION IV INFUSION INIT: CPT

## 2025-06-30 PROCEDURE — 99284 EMERGENCY DEPT VISIT MOD MDM: CPT

## 2025-06-30 PROCEDURE — 71250 CT THORAX DX C-: CPT

## 2025-06-30 RX ORDER — 0.9 % SODIUM CHLORIDE 0.9 %
1000 INTRAVENOUS SOLUTION INTRAVENOUS ONCE
Status: DISCONTINUED | OUTPATIENT
Start: 2025-06-30 | End: 2025-06-30

## 2025-06-30 RX ORDER — 0.9 % SODIUM CHLORIDE 0.9 %
500 INTRAVENOUS SOLUTION INTRAVENOUS ONCE
Status: COMPLETED | OUTPATIENT
Start: 2025-06-30 | End: 2025-06-30

## 2025-06-30 RX ORDER — ONDANSETRON 4 MG/1
4 TABLET, ORALLY DISINTEGRATING ORAL
Status: COMPLETED | OUTPATIENT
Start: 2025-06-30 | End: 2025-06-30

## 2025-06-30 RX ADMIN — SODIUM CHLORIDE 500 ML: 0.9 INJECTION, SOLUTION INTRAVENOUS at 10:45

## 2025-06-30 RX ADMIN — ONDANSETRON 4 MG: 4 TABLET, ORALLY DISINTEGRATING ORAL at 09:48

## 2025-06-30 ASSESSMENT — PAIN - FUNCTIONAL ASSESSMENT: PAIN_FUNCTIONAL_ASSESSMENT: 0-10

## 2025-06-30 ASSESSMENT — PAIN SCALES - GENERAL: PAINLEVEL_OUTOF10: 5

## 2025-06-30 ASSESSMENT — PAIN DESCRIPTION - LOCATION: LOCATION: CHEST

## 2025-06-30 NOTE — ED PROVIDER NOTES
underlying fracture noted and no vascular damage noted      Contaminated: no    Treatment:     Area cleansed with:  Soap and water    Amount of cleaning:  Standard    Irrigation solution:  Tap water    Irrigation method:  Tap    Visualized foreign bodies/material removed: no      Debridement:  None    Undermining:  None    Scar revision: no    Skin repair:     Repair method:  Tissue adhesive  Approximation:     Approximation:  Close  Repair type:     Repair type:  Simple  Post-procedure details:     Dressing:  Open (no dressing)    Procedure completion:  Tolerated well, no immediate complications       CRITICAL CARE TIME       EMERGENCY DEPARTMENT COURSE and DIFFERENTIAL DIAGNOSIS/MDM   Vitals:    Vitals:    06/30/25 0706 06/30/25 0741   BP: 137/86    Pulse: 91    Resp: 20    Temp:  98.2 °F (36.8 °C)   TempSrc:  Oral   SpO2: 96%    Weight: 79.4 kg (175 lb)    Height: 1.778 m (5' 10\")         Patient was given the following medications:  Medications   ondansetron (ZOFRAN-ODT) disintegrating tablet 4 mg (4 mg Oral Given 6/30/25 0948)   sodium chloride 0.9 % bolus 500 mL (0 mLs IntraVENous Stopped 6/30/25 1142)       CONSULTS: (Who and What was discussed)  None    Chronic Conditions: As above    Social Determinants affecting Dx or Tx: None    Records Reviewed (source and summary): Old medical records.  Nursing notes.      CC/HPI Summary, DDx, ED Course, and Reassessment:   Patient brought to ED via EMS after a fall at home.  He reports he tried to rush going to the bathroom and fell face down.  He denies loss of consciousness, his no headache or neck pain but complained of large bruise of chest and also laceration left knee pain  Exam reveals large bruising anterior lower chest and extending to the upper abdomen, there is tenderness to palpation but no crepitus.  Rest of abdomen appears intact.  There is small skin tear left knee which was repaired as in procedure note  Patient evaluated with CT scan chest abdomen

## 2025-06-30 NOTE — ED TRIAGE NOTES
Pt arrives via EMS with c/o fall after getting up to use restroom, pt tripped over walker. Pt complains of cp from hitting walker in chest 5/10    Abrasion to chest, L knee, L arm. Is endorses blood thinners however not apparent what blood thinner from pt home hand written med list.

## (undated) DEVICE — BAG URIN LEG DISPOZ-A-BG 19OZ -- W/18IN EXT TUBING

## (undated) DEVICE — CATH URETH FOL 2W SH 22FRX5ML -- CONVERT TO ITEM 363075

## (undated) DEVICE — POUCH DRNGE FLX BND INTEGR RAIL CLMP DISP EZ CTCH

## (undated) DEVICE — [FOUR SPIKE IRRIGATOR SET,  NON-PYROGENIC FLUID PATH,  DO NOT USE IF PACKAGE IS DAMAGED]

## (undated) DEVICE — SET ADMIN IV PMP 20GTT 117IN -- 2 NDLS INJ SITE

## (undated) DEVICE — SOL IRR STRL H2O 3000ML BG -- USE ITEM 173640

## (undated) DEVICE — GLOVE ORANGE PI 7   MSG9070

## (undated) DEVICE — SOLUTION IRRIG 1500ML 0.9% SOD CHL USP POUR PLAS BTL

## (undated) DEVICE — SOL IRR SOD CL 0.9% 3000ML --

## (undated) DEVICE — CYSTO PACK: Brand: MEDLINE INDUSTRIES, INC.